# Patient Record
Sex: FEMALE | Race: WHITE | NOT HISPANIC OR LATINO | Employment: FULL TIME | URBAN - METROPOLITAN AREA
[De-identification: names, ages, dates, MRNs, and addresses within clinical notes are randomized per-mention and may not be internally consistent; named-entity substitution may affect disease eponyms.]

---

## 2017-02-15 ENCOUNTER — ALLSCRIPTS OFFICE VISIT (OUTPATIENT)
Dept: OTHER | Facility: OTHER | Age: 22
End: 2017-02-15

## 2017-03-22 ENCOUNTER — GENERIC CONVERSION - ENCOUNTER (OUTPATIENT)
Dept: OTHER | Facility: OTHER | Age: 22
End: 2017-03-22

## 2017-03-22 ENCOUNTER — ALLSCRIPTS OFFICE VISIT (OUTPATIENT)
Dept: OTHER | Facility: OTHER | Age: 22
End: 2017-03-22

## 2017-03-27 ENCOUNTER — GENERIC CONVERSION - ENCOUNTER (OUTPATIENT)
Dept: OTHER | Facility: OTHER | Age: 22
End: 2017-03-27

## 2017-04-05 ENCOUNTER — ALLSCRIPTS OFFICE VISIT (OUTPATIENT)
Dept: PSYCHOLOGY | Facility: CLINIC | Age: 22
End: 2017-04-05
Payer: COMMERCIAL

## 2017-04-05 PROCEDURE — H0035 MH PARTIAL HOSP TX UNDER 24H: HCPCS | Performed by: PSYCHIATRY & NEUROLOGY

## 2017-04-05 PROCEDURE — 90791 PSYCH DIAGNOSTIC EVALUATION: CPT | Performed by: PSYCHIATRY & NEUROLOGY

## 2017-04-06 ENCOUNTER — APPOINTMENT (OUTPATIENT)
Dept: PSYCHOLOGY | Facility: CLINIC | Age: 22
End: 2017-04-06
Payer: COMMERCIAL

## 2017-04-06 ENCOUNTER — GENERIC CONVERSION - ENCOUNTER (OUTPATIENT)
Dept: OTHER | Facility: OTHER | Age: 22
End: 2017-04-06

## 2017-04-06 PROCEDURE — H0035 MH PARTIAL HOSP TX UNDER 24H: HCPCS | Performed by: PSYCHIATRY & NEUROLOGY

## 2017-04-07 ENCOUNTER — GENERIC CONVERSION - ENCOUNTER (OUTPATIENT)
Dept: OTHER | Facility: OTHER | Age: 22
End: 2017-04-07

## 2017-04-10 ENCOUNTER — GENERIC CONVERSION - ENCOUNTER (OUTPATIENT)
Dept: OTHER | Facility: OTHER | Age: 22
End: 2017-04-10

## 2017-04-10 ENCOUNTER — APPOINTMENT (OUTPATIENT)
Dept: PSYCHOLOGY | Facility: CLINIC | Age: 22
End: 2017-04-10
Payer: COMMERCIAL

## 2017-04-10 PROCEDURE — H0035 MH PARTIAL HOSP TX UNDER 24H: HCPCS | Performed by: PSYCHIATRY & NEUROLOGY

## 2017-04-11 ENCOUNTER — APPOINTMENT (OUTPATIENT)
Dept: PSYCHOLOGY | Facility: CLINIC | Age: 22
End: 2017-04-11
Payer: COMMERCIAL

## 2017-04-11 ENCOUNTER — GENERIC CONVERSION - ENCOUNTER (OUTPATIENT)
Dept: OTHER | Facility: OTHER | Age: 22
End: 2017-04-11

## 2017-04-11 PROCEDURE — H0035 MH PARTIAL HOSP TX UNDER 24H: HCPCS | Performed by: PSYCHIATRY & NEUROLOGY

## 2017-04-12 ENCOUNTER — GENERIC CONVERSION - ENCOUNTER (OUTPATIENT)
Dept: OTHER | Facility: OTHER | Age: 22
End: 2017-04-12

## 2017-04-12 ENCOUNTER — ALLSCRIPTS OFFICE VISIT (OUTPATIENT)
Dept: OTHER | Facility: OTHER | Age: 22
End: 2017-04-12

## 2017-04-13 ENCOUNTER — APPOINTMENT (OUTPATIENT)
Dept: PSYCHOLOGY | Facility: CLINIC | Age: 22
End: 2017-04-13
Payer: COMMERCIAL

## 2017-04-13 ENCOUNTER — GENERIC CONVERSION - ENCOUNTER (OUTPATIENT)
Dept: OTHER | Facility: OTHER | Age: 22
End: 2017-04-13

## 2017-04-13 PROCEDURE — H0035 MH PARTIAL HOSP TX UNDER 24H: HCPCS | Performed by: PSYCHIATRY & NEUROLOGY

## 2017-04-14 ENCOUNTER — GENERIC CONVERSION - ENCOUNTER (OUTPATIENT)
Dept: OTHER | Facility: OTHER | Age: 22
End: 2017-04-14

## 2017-04-17 ENCOUNTER — GENERIC CONVERSION - ENCOUNTER (OUTPATIENT)
Dept: OTHER | Facility: OTHER | Age: 22
End: 2017-04-17

## 2017-04-18 ENCOUNTER — APPOINTMENT (OUTPATIENT)
Dept: PSYCHOLOGY | Facility: CLINIC | Age: 22
End: 2017-04-18
Payer: COMMERCIAL

## 2017-04-18 ENCOUNTER — GENERIC CONVERSION - ENCOUNTER (OUTPATIENT)
Dept: OTHER | Facility: OTHER | Age: 22
End: 2017-04-18

## 2017-04-18 PROCEDURE — H0035 MH PARTIAL HOSP TX UNDER 24H: HCPCS | Performed by: PSYCHIATRY & NEUROLOGY

## 2017-04-19 ENCOUNTER — GENERIC CONVERSION - ENCOUNTER (OUTPATIENT)
Dept: OTHER | Facility: OTHER | Age: 22
End: 2017-04-19

## 2017-04-20 ENCOUNTER — GENERIC CONVERSION - ENCOUNTER (OUTPATIENT)
Dept: OTHER | Facility: OTHER | Age: 22
End: 2017-04-20

## 2017-04-20 ENCOUNTER — APPOINTMENT (OUTPATIENT)
Dept: PSYCHOLOGY | Facility: CLINIC | Age: 22
End: 2017-04-20
Payer: COMMERCIAL

## 2017-04-20 PROCEDURE — H0035 MH PARTIAL HOSP TX UNDER 24H: HCPCS | Performed by: PSYCHIATRY & NEUROLOGY

## 2017-05-10 ENCOUNTER — ALLSCRIPTS OFFICE VISIT (OUTPATIENT)
Dept: OTHER | Facility: OTHER | Age: 22
End: 2017-05-10

## 2017-05-12 ENCOUNTER — GENERIC CONVERSION - ENCOUNTER (OUTPATIENT)
Dept: OTHER | Facility: OTHER | Age: 22
End: 2017-05-12

## 2017-06-07 ENCOUNTER — ALLSCRIPTS OFFICE VISIT (OUTPATIENT)
Dept: OTHER | Facility: OTHER | Age: 22
End: 2017-06-07

## 2017-06-10 ENCOUNTER — HOSPITAL ENCOUNTER (EMERGENCY)
Facility: HOSPITAL | Age: 22
Discharge: HOME | End: 2017-06-10
Attending: EMERGENCY MEDICINE | Admitting: EMERGENCY MEDICINE
Payer: COMMERCIAL

## 2017-06-10 VITALS
HEART RATE: 64 BPM | TEMPERATURE: 97.9 F | OXYGEN SATURATION: 100 % | WEIGHT: 164 LBS | HEIGHT: 65 IN | BODY MASS INDEX: 27.32 KG/M2 | DIASTOLIC BLOOD PRESSURE: 77 MMHG | RESPIRATION RATE: 16 BRPM | SYSTOLIC BLOOD PRESSURE: 129 MMHG

## 2017-06-10 DIAGNOSIS — R42 DIZZINESS: Primary | ICD-10-CM

## 2017-06-10 LAB
ATRIAL RATE: 63 BPM
P AXIS: 60 DEGREES
PR INTERVAL: 118 MS
QRS AXIS: 82 DEGREES
QRSD INTERVAL: 96 MS
QT INTERVAL: 400 MS
QTC INTERVAL: 409 MS
T WAVE AXIS: 50 DEGREES
VENTRICULAR RATE: 63 BPM

## 2017-06-10 PROCEDURE — 93005 ELECTROCARDIOGRAM TRACING: CPT

## 2017-06-10 PROCEDURE — 99284 EMERGENCY DEPT VISIT MOD MDM: CPT

## 2017-06-10 RX ORDER — VILAZODONE HYDROCHLORIDE 10 MG/1
10 TABLET ORAL
COMMUNITY
End: 2018-02-28

## 2017-06-10 RX ORDER — GABAPENTIN 100 MG/1
200 CAPSULE ORAL 3 TIMES DAILY
COMMUNITY
End: 2018-04-20

## 2017-06-10 RX ORDER — MELOXICAM 15 MG/1
15 TABLET ORAL DAILY
COMMUNITY

## 2017-06-10 RX ORDER — SPIRONOLACTONE 50 MG/1
50 TABLET, FILM COATED ORAL 2 TIMES DAILY
COMMUNITY

## 2017-06-10 RX ORDER — CLONAZEPAM 1 MG/1
1.5 TABLET ORAL
COMMUNITY
End: 2018-02-13 | Stop reason: SDUPTHER

## 2017-06-16 ENCOUNTER — ALLSCRIPTS OFFICE VISIT (OUTPATIENT)
Dept: OTHER | Facility: OTHER | Age: 22
End: 2017-06-16

## 2017-06-20 ENCOUNTER — ALLSCRIPTS OFFICE VISIT (OUTPATIENT)
Dept: OTHER | Facility: OTHER | Age: 22
End: 2017-06-20

## 2017-07-07 ENCOUNTER — GENERIC CONVERSION - ENCOUNTER (OUTPATIENT)
Dept: OTHER | Facility: OTHER | Age: 22
End: 2017-07-07

## 2017-07-11 ENCOUNTER — ALLSCRIPTS OFFICE VISIT (OUTPATIENT)
Dept: OTHER | Facility: OTHER | Age: 22
End: 2017-07-11

## 2017-07-14 ENCOUNTER — ALLSCRIPTS OFFICE VISIT (OUTPATIENT)
Dept: OTHER | Facility: OTHER | Age: 22
End: 2017-07-14

## 2017-07-28 ENCOUNTER — GENERIC CONVERSION - ENCOUNTER (OUTPATIENT)
Dept: OTHER | Facility: OTHER | Age: 22
End: 2017-07-28

## 2017-08-04 ENCOUNTER — GENERIC CONVERSION - ENCOUNTER (OUTPATIENT)
Dept: OTHER | Facility: OTHER | Age: 22
End: 2017-08-04

## 2017-08-08 ENCOUNTER — ALLSCRIPTS OFFICE VISIT (OUTPATIENT)
Dept: OTHER | Facility: OTHER | Age: 22
End: 2017-08-08

## 2017-08-31 ENCOUNTER — ALLSCRIPTS OFFICE VISIT (OUTPATIENT)
Dept: OTHER | Facility: OTHER | Age: 22
End: 2017-08-31

## 2017-09-01 ENCOUNTER — ALLSCRIPTS OFFICE VISIT (OUTPATIENT)
Dept: OTHER | Facility: OTHER | Age: 22
End: 2017-09-01

## 2017-10-04 ENCOUNTER — ALLSCRIPTS OFFICE VISIT (OUTPATIENT)
Dept: OTHER | Facility: OTHER | Age: 22
End: 2017-10-04

## 2017-10-11 ENCOUNTER — ALLSCRIPTS OFFICE VISIT (OUTPATIENT)
Dept: OTHER | Facility: OTHER | Age: 22
End: 2017-10-11

## 2017-10-12 ENCOUNTER — GENERIC CONVERSION - ENCOUNTER (OUTPATIENT)
Dept: BEHAVIORAL HEALTH UNIT | Facility: HOSPITAL | Age: 22
End: 2017-10-12

## 2017-10-19 ENCOUNTER — GENERIC CONVERSION - ENCOUNTER (OUTPATIENT)
Dept: OTHER | Facility: OTHER | Age: 22
End: 2017-10-19

## 2017-10-26 ENCOUNTER — GENERIC CONVERSION - ENCOUNTER (OUTPATIENT)
Dept: OTHER | Facility: OTHER | Age: 22
End: 2017-10-26

## 2017-10-26 ENCOUNTER — ALLSCRIPTS OFFICE VISIT (OUTPATIENT)
Dept: OTHER | Facility: OTHER | Age: 22
End: 2017-10-26

## 2017-11-29 ENCOUNTER — ALLSCRIPTS OFFICE VISIT (OUTPATIENT)
Dept: OTHER | Facility: OTHER | Age: 22
End: 2017-11-29

## 2017-12-13 ENCOUNTER — ALLSCRIPTS OFFICE VISIT (OUTPATIENT)
Dept: OTHER | Facility: OTHER | Age: 22
End: 2017-12-13

## 2017-12-19 ENCOUNTER — ALLSCRIPTS OFFICE VISIT (OUTPATIENT)
Dept: OTHER | Facility: OTHER | Age: 22
End: 2017-12-19

## 2017-12-27 ENCOUNTER — ALLSCRIPTS OFFICE VISIT (OUTPATIENT)
Dept: OTHER | Facility: OTHER | Age: 22
End: 2017-12-27

## 2018-01-09 NOTE — PSYCH
Messages    Raul Ly is under my professional care  She was seen in my office on 4/12/2017    She is not able to return to work until further notice  Patient is currently receiving daily treatment for a medical condition that prohibits her from working at this time  Please excuse her from working until she is medically cleared to return to work  Thanks for your understanding  BERE Llamas  Signatures   Electronically signed by :  BERE Llamas ; Apr 12 2017  1:37PM EST                       (Author)
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
## 2018-01-09 NOTE — MISCELLANEOUS
Message  Provider reviewed [employer] testing results with patient  Discussed that patient may have more side effects on Wellbutrin due to genotype, patient was already interested in switching medication due to concerns about side effect profile  Discussed tapering Wellbutrin SR to 100 mg bid for 1 week, then 100 mg daily for 1 week, then discontinuing medication  Will start Viibryd 10 mg daily once patient has tapered to Wellbutrin  mg daily  Will f/u at next scheduled visit on 4/12/17  Patient still interested in [address], awaiting to hear about openings for the program  Patient requests the [employer] testing results be e-mailed to her, gives consent for electronic transmission of results      -Will start Viibryd 10 mg daily for depressive symptoms  1        1 Amended By: Cuca Randall; Mar 28 2017 2:07 PM EST    Plan  Severe recurrent major depression    · Start: Viibryd 10 MG Oral Tablet; TAKE 1 TABLET Daily1    · Changed: From  BuPROPion HCl ER (SR) 150 MG Oral Tablet Extended Release 12  Hour TAKE 1 TABLET DAILY FOR 1 WEEK, THEN TAKE 1 TABLET TWICE DAILY To  BuPROPion HCl ER (SR) 100 MG Oral Tablet Extended Release 12 Hour Take 1 tablet  twice daily for 1 week, then 1 tablet daily for 1 week, then discontinue     1 Amended By: Cuca Randall; Mar 28 2017 2:08 PM EST    Signatures   Electronically signed by :  BERE Alcala ; Mar 28 2017  2:08PM EST                       (Author)

## 2018-01-10 ENCOUNTER — GENERIC CONVERSION - ENCOUNTER (OUTPATIENT)
Dept: OTHER | Facility: OTHER | Age: 23
End: 2018-01-10
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
## 2018-01-10 NOTE — PSYCH
Psych Med Mgmt    Appearance: was calm and cooperative  Observed mood: depressed, irritable and anxious  Observed mood: affect appropriate  Speech: a normal rate  Thought processes: normal thought processes  Hallucinations: no hallucinations present  Thought Content: no delusions  Abnormal Thoughts: The patient has no suicidal thoughts  Orientation: The patient is oriented to person, place and time, oriented to person, oriented to place and oriented to time  Recent and Remote Memory: short term memory intact and long term memory intact  Insight: Limited insight  Judgment: Concentration decreased Her judgment was limited  Muscle Strength And Tone  Muscle strength and tone were normal  Normal gait and station  Language: no difficulty naming common objects, no difficulty repeating a phrase and no difficulty writing a sentence  Fund of knowledge: Patient displays  Average  The patient is experiencing no localized pain  On a scale of 0 - 10 the pain severity is a 0  Treatment Recommendations: I met with Vickie Chapmanes by myself  Her mother had called a few days ago requesting for Vickie Huang to be seen that she was more depressed and not getting out of her room  When I saw Vickie Chapmanes today she stated she has not done well since she stopped her medications  Last time she had stated she thought her medications were giving her a lot of side effects and they were not as effective and she wanted to try without it  Since then she has noticed that she is shay , that she is often angry and irritable and at the same very depressed and isolating herself    She has not been in contact with any friends not returning their messages (however someone texted her while she was in my office and she responded, when I brought that to her attention she said Oh, this is the first time I've answered back)  As we reviewed her medications that she had tried in the past and the symptoms she has in the present, we discussed the need for an antidepressant that could help her at night with her sleep, since insomnia has been an issue for her for so many years  In her family she has several members that suffer from bipolar disorder and while her symptoms to not raise to the level of bipolar illness she is experiencing significant labile mood and irritability that we did talk about the mood stabilizer such as Lamictal    We discussed benefits risks and side effects and she had agreed  After she left the pharmacy called me stating that it affected the levels of both Lamictal and her birth control , so we put it on hold for now  As far as her depression and anxiety we did discuss Luvox, after hearing benefits and risks she was willing to try starting 50 mg with half a tablet with dinner and to increase to one tablet after a week  She denied suicidal thoughts or plans and no thoughts about hurting other people  She brought me a form for homebound school and hopes that that will happen for her soon  She has not been in school since the beginning of the year  Bucky Velez will call me with any problems, in the past she says she has called, and no one called her back, so I told her she could call the after hours service and asked that they give us a message to contact her the next day  We made an appointment for Friday, January 29 at 11:00  Vitals  Signs [Data Includes: Current Encounter]   Recorded: 61YQM7141 12:07PM   Height: 5 ft 4 5 in  2-20 Stature Percentile: 54 %  Weight: 196 lb   2-20 Weight Percentile: 97 %  BMI Calculated: 33 12  BMI Percentile: 97 %  BSA Calculated: 1 95    Assessment    1  Anxiety disorder (300 00) (F41 9)   2  Severe recurrent major depression (296 33) (F33 2)    Plan    1  FluvoxaMINE Maleate 50 MG Oral Tablet; Take 1/2 tablet for four days then one daily   in the evening    2  LamoTRIgine 25 MG Oral Tablet (LaMICtal);  Take 1/2 tablet for three days, then one   tablet for two weeks,   then two daily    Review of Systems    Constitutional: recent 10 lb weight gain  Cardiovascular: no complaints of slow or fast heart rate, no chest pain, no palpitations  Respiratory: no complaints of shortness of breath, no wheezing, no dyspnea on exertion  Gastrointestinal: no complaints of abdominal pain, no constipation, no nausea, no diarrhea, no vomiting  Genitourinary: no complaints of dysuria, no incontinence, no pelvic pain, no urinary frequency  Musculoskeletal: no complaints of arthralgia, no myalgias, no limb pain, no joint stiffness  Integumentary: Acne  Neurological: no complaints of headache, no confusion, no numbness, no dizziness  Active Problems    1  Anxiety disorder (300 00) (F41 9)   2  Persistent insomnia (307 42) (G47 00)   3  Severe recurrent major depression (296 33) (F33 2)    Past Medical History    1  History of asthma (V12 69) (Z87 09)    The active problems and past medical history were reviewed and updated today  Allergies    1  Penicillins    Current Meds    The medication list was reviewed and updated today  Family Psych History    1  Family history of Anxiety   2  Family history of Bipolar affective disorder   3  Family history of depression (V17 0) (Z81 8)    The family history was reviewed and updated today  Social History    · Never a smoker   · Parents are   The social history was reviewed and updated today  The social history was reviewed and is unchanged  Senior at [employer]  End of Encounter Meds    1  FluvoxaMINE Maleate 50 MG Oral Tablet; Take 1/2 tablet for four days then one daily in the   evening; Therapy: 07ARG9015 to (Evaluate:32Kif4285)  Requested for: 06CEF6878; Last   Rx:15Jan2016 Ordered    2  LamoTRIgine 25 MG Oral Tablet (LaMICtal); Take 1/2 tablet for three days, then one tablet   for two weeks,   then two daily;    Therapy: 62VXI0185 to (Evaluate:85Imi8957)  Requested for: 65XAJ3632; Last   Rx:15Jan2016 Ordered    Future Appointments    Date/Time Provider Specialty Site   01/27/2016 11:30 AM Delisa Stewart MD Psychiatry [facility]   01/29/2016 11:00 AM Delisa Stewart MD Psychiatry [place] [id number]     Signatures   Electronically signed by : Kuldeep Gates MD; Angel 15 2016  2:03PM EST                       (Author)

## 2018-01-10 NOTE — MISCELLANEOUS
Message  Provider spoke with patient on phone  Patient feels that Tyron Titus has been helping with her mood symptoms, has concerns about weight gain on medication  Patient continues to report having disrupted sleep at night, has been taking the Klonopin as prescribed  Patient cancelled appointment with provider on 5/15, advised to re-schedule appointment to further discuss medication changes  Will provide refill of Klonopin medication  -PDMP system checked   1        1 Amended By: Diane Flanagan; May 12 2017 5:10 PM EST    Plan  Anxiety disorder, Persistent insomnia    · Renew: ClonazePAM 1 MG Oral Tablet (KlonoPIN); Take 1/2 TABLET IN THE MORNING  AND 1 5 TABLET AT NIGHT AS NEEDED FOR ANXIETY    Signatures   Electronically signed by :  BERE Go ; May 12 2017  5:11PM EST                       (Author)

## 2018-01-10 NOTE — PSYCH
Progress Note  Psychotherapy Provided [facility]: Individual Psychotherapy 50 minutes provided today  Goals addressed in session:   Addressed goal 3 of initial plan    D: Met with Key individually  ROS; feels 'OK despite lots of stuff going on'  Session focused primarily on specific triggers affecting Key's view of herself emotionally and physically  This has been influenced by historic trauma, current and historic family dynamics and financial obstacles  Alexi North identified feelings of pride for specific risk taking skills regarding relationships, employment and boundaries  Acknowledged fleeting SI without a plan  A: Alexi North presented with appropriate mood and affect for topics of discussion today  Key's body image, self esteem deeply impact her negative thoughts of self  She does demonstrate progress in that she is taking risk to help challenge these thoughts  P: Continue weekly individual therapy for further discussion regarding taking risks to challenge anxiety, internalization and self esteem  P:       Pain Scale and Suicide Risk [facility]: Current Pain Assessment: no pain   Current suicide risk is low   Behavioral Health Treatment Plan Trupti Berrios: Diagnosis and Treatment Plan explained to patient, patient relates understanding diagnosis and is agreeable to Treatment Plan  Results/Data  PHQ-9 Adult Depression Screening 20Jun2017 02:04PM [employer]     Test Name Result Flag Reference   PHQ-9 Adult Depression Score 19     Over the last two weeks, how often have you been bothered by any of the following problems?   Little interest or pleasure in doing things: Several days - 1  Feeling down, depressed, or hopeless: Nearly every day - 3  Trouble falling or staying asleep, or sleeping too much: More than half the days - 2  Feeling tired or having little energy: More than half the days - 2  Poor appetite or over eating: Nearly every day - 3  Feeling bad about yourself - or that you are a failure or have let yourself or your family down: Nearly every day - 3  Trouble concentrating on things, such as reading the newspaper or watching television: Nearly every day - 3  Moving or speaking so slowly that other people could have noticed  Or the opposite -  being so fidgety or restless that you have been moving around a lot more than usual: Not at all - 0  Thoughts that you would be better off dead, or of hurting yourself in some way: More than half the days - 2   PHQ-9 Adult Depression Screening Positive     PHQ-9 Difficulty Level Very difficult     PHQ-9 Severity      Moderately Severe Depression     GAD7 - Generalized Anxiety Disorder 20Jun2017 01:56PM Cletis Art     Test Name Result Flag Reference   GAD7 - Anxiety Severity Level Severe Anxiety     GAD7 - Difficulty Level Very difficult     How difficult have those problems made it for you to do your work, take care of things at home, or get along with other people? GAD7 - Score 15     Over the last two weeks, how often have you been bothered by the following problems? Feeling nervous, anxious, or on edge Over half the days - 2  Not being able to stop or control worrying Nearly every day - 3  Worrying too much about different things Nearly every day - 3  Trouble relaxing Nearly every day - 3  Being so restless that it's hard to sit still Several days - 1  Becoming easily annoyed or irritable Several days - 1  Feeling afraid as if something awful might happen Over half the days - 2       Assessment    1  Anxiety disorder (300 00) (F41 9)   2   Severe recurrent major depression (296 33) (F33 2)    Signatures   Electronically signed by : Kari Jasso LCSW; Jun 20 2017  5:25PM EST                       (Author)

## 2018-01-10 NOTE — PSYCH
Treatment Plan Tracking    #1 Treatment Plan not completed within required time limits due to: Client cancelled/ no-showed scheduled appointment            Signatures   Electronically signed by : Amada Yepez LCSW; May 30 2017  9:09AM EST                       (Author)

## 2018-01-10 NOTE — PSYCH
Date of Initial Treatment Plan: 6/16/17  Date of Current Treatment Plan: 6/16/17  Treatment Plan 1  Strengths/Personal Resources for Self Care: Giving, good worker, mature, goal orientated  Diagnosis:   Axis I: Anxiety disorder; Mood Disorser; MDD recurrent; severe     Area of Needs: Work stress, historic trauma, toxic environment, medical issues, anxiety, depression  Long Term Goals:   I am satisfied with my level of independence   Target Date: 10/05/17      My anxiety and depression have decreased   Target Date: 10/05/17      I have addressed my historic and present family dynamics   Target Date: 10/05/17    Short Term Objectives:   Goal 1:   1  Family dynamics  2  Financial obligations  3  Alternate job interviews    Target Date: 10/05/17      Goal 2:   1  Emotional boundaries  2  Take time for myself  3  Driving  Target Date: 10/05/17      Goal 3:   1  I have moved out of my home  2  Historic trauma  Target Date: 10/05/17      GOAL 1: Modality: Individual 4 x per month Target Date: 10/05/17       The person(s) responsible for carrying out the plan is Christa Love  GOAL 2: Modality: Individual 4 x per month Target Date: 10/05/17       The person(s) responsible for carrying out the plan is Christa Love  GOAL 3: Modality: Individual 4 x per month Target Date: 10/05/17         The person(s) responsible for carrying out the plan is Christa Love  The first scheduled review date is 10/05/17  The expected length of service is 120 Days  Level of functioning at initial assessment: 70  The highest level of functioning in the past year was Unknown  The current level of functioning is 70               CLIENT COMMENTS / Please share your thoughts, feelings, need and/or experiences regarding your treatment plan: _____________________________________________________________________________________________________________________________________________________________________________________________________________________________________________________________________________________________________________________ Date/Time: ______________     Patient Signature: _________________________________ Date/Time: ______________       Electronically signed by : Harrison Durán, MOLINAW; Jun 16 2017 12:55PM EST                       (Author)

## 2018-01-11 NOTE — PSYCH
Psych Med Mgmt    Appearance: was calm and cooperative  Observed mood: depressed, irritable and anxious  Observed mood: affect appropriate  Speech: a normal rate  Thought processes: normal thought processes  Hallucinations: no hallucinations present  Thought Content: no delusions  Abnormal Thoughts: The patient has no suicidal thoughts  Orientation: The patient is oriented to person, place and time, oriented to person, oriented to place and oriented to time  Recent and Remote Memory: short term memory intact and long term memory intact  Judgment: Concentration varies   Muscle Strength And Tone  Muscle strength and tone were normal  Normal gait and station  Language: no difficulty naming common objects, no difficulty repeating a phrase and no difficulty writing a sentence  Fund of knowledge: Patient displays  At grade level  The patient is experiencing no localized pain  On a scale of 0 - 10 the pain severity is a 0  Treatment Recommendations: Raisa Romero was seen by herself  She stated she's not sure if medications are helpful or not  She still finds that she is very angry, shay and is just not happy  She also has a lot of anxiety but Klonopin seems to be helping with that and is also helping at night with her sleep  Because we're not sure if the medications are helping her or not, I discussed with her she could take Neurontin 100 mg up to 3 times per day, and the dose of Wellbutrin  mg in the morning is not therapeutic enough that she could take it twice a day  I did discuss with Key the medication lithium  I discussed its benefits and risks and asked her to discuss it with her mother, and to let me know if that would be something that they would consider  It could help with her labile mood and her irritability as well as her chronic depression and suicidal thoughts  I did tell them we had to get regular blood work and the therapeutic window of lithium    She did say she would discuss it with her mother and will let me know, for now we will continue with her present medications the same  She denied any suicidal thoughts or plans, she is just frustrated that she is not getting better  Assessment    1  Anxiety disorder (300 00) (F41 9)   2  Mood disorder (296 90) (F39)   3  Persistent insomnia (307 42) (G47 00)   4  Severe recurrent major depression (296 33) (F33 2)    Plan    1  Gabapentin 100 MG Oral Capsule; take one capsule THREE per day    2  ClonazePAM 1 MG Oral Tablet (KlonoPIN); Take 1/2  TABLET IN THE MORNING   AND ONE TABLET AT NIGHT IF NEEDED FOR ANXIETY    3  BuPROPion HCl ER (SR) 100 MG Oral Tablet Extended Release 12 Hour   (Wellbutrin SR); TAKE 1 TABLET TWICE PER DAY    Review of Systems    Constitutional: No fever, no chills, feels well, no tiredness, no recent weight gain or loss  Cardiovascular: no complaints of slow or fast heart rate, no chest pain, no palpitations  Respiratory: no complaints of shortness of breath, no wheezing, no dyspnea on exertion  Gastrointestinal: no complaints of abdominal pain, no constipation, no nausea, no diarrhea, no vomiting  Genitourinary: no complaints of dysuria, no incontinence, no pelvic pain, no urinary frequency  Musculoskeletal: no complaints of arthralgia, no myalgias, no limb pain, no joint stiffness  Integumentary: no complaints of skin rash, no itching, no dry skin  Neurological: no complaints of headache, no confusion, no numbness, no dizziness  Active Problems    1  Anxiety disorder (300 00) (F41 9)   2  Mood disorder (296 90) (F39)   3  Persistent insomnia (307 42) (G47 00)   4  Severe recurrent major depression (296 33) (F33 2)    Past Medical History    1  History of asthma (V12 69) (Z87 09)    The active problems and past medical history were reviewed and updated today  Allergies    1  Penicillins    Current Meds   1   BuPROPion HCl ER (SR) 100 MG Oral Tablet Extended Release 12 Hour; TAKE 1   TABLET DAILY; Therapy: 64IQH1631 to (Brennan Garzon)  Requested for: 79NBQ9142; Last   Rx:29Jan2016 Ordered   2  ClonazePAM 1 MG Oral Tablet; Take 1/2 to one tablet at bedtime if needed for anxiety; Therapy: 89ORQ9760 to (Evaluate:16Apr2016); Last Rx:17Acf2835 Ordered   3  Gabapentin 100 MG Oral Capsule; take one capsule twice per day; Therapy: 51ZYN7449 to (Evaluate:16Apr2016)  Requested for: 45GGV2988; Last   Rx:25Xil8529 Ordered    The medication list was reviewed and updated today  Family Psych History    1  Family history of Anxiety   2  Family history of Bipolar affective disorder   3  Family history of depression (V17 0) (Z81 8)    The family history was reviewed and updated today  Social History    · Never a smoker   · Parents are   The social history was reviewed and updated today  The social history was reviewed and is unchanged  She is a senior  End of Encounter Meds    1  Gabapentin 100 MG Oral Capsule; take one capsule THREE per day; Therapy: 02EJQ5277 to (Carito Dent)  Requested for: 98GVW1726; Last   Rx:08Mar2016 Ordered    2  ClonazePAM 1 MG Oral Tablet (KlonoPIN); Take 1/2  TABLET IN THE MORNING AND ONE   TABLET AT NIGHT IF NEEDED FOR ANXIETY; Therapy: 50TVF7616 to (Evaluate:14Krn8063); Last Rx:08Mar2016 Ordered    3  BuPROPion HCl ER (SR) 100 MG Oral Tablet Extended Release 12 Hour (Wellbutrin   SR); TAKE 1 TABLET TWICE PER DAY;    Therapy: 44XHM5089 to (Carito Dent)  Requested for: 00HOH0910; Last   Rx:08Mar2016 Ordered    Future Appointments    Date/Time Provider Specialty Site   03/29/2016 05:00 PM Patricia Green MD Psychiatry [facility]   04/01/2016 02:45 PM [facility]     Signatures   Electronically signed by : Lizzie Flores MD; Mar 19 2016 10:19PM EST                       (Author)
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
## 2018-01-11 NOTE — PSYCH
Treatment Plan Tracking    #1 Treatment Plan not completed within required time limits due to: Other: There was no time during the initial assessment to complete the treatment plan             Signatures   Electronically signed by : ANUJ Flannery; Apr 1 2016  4:11PM EST                       (Author)

## 2018-01-11 NOTE — MISCELLANEOUS
Message  I spoke with pt 's mother, Bishop العلي, and reviewed potential for decreased effectiveness of birth control when taking lamotrigine at the same time  Jojo Wilfredo verbalized understanding and wanted to followed med regimine per last office visit  Instructed to call with problems/concerns and verbalized understanding of same  Active Problems    1  Anxiety disorder (300 00) (F41 9)   2  Persistent insomnia (307 42) (G47 00)   3  Severe recurrent major depression (296 33) (F33 2)    Current Meds   1  FluvoxaMINE Maleate 50 MG Oral Tablet; Take 1/2 tablet for four days then one daily in   the evening; Therapy: 92YFD9132 to (Evaluate:91Dkr7756)  Requested for: 94MFK0147; Last   Rx:15Jan2016 Ordered   2  LamoTRIgine 25 MG Oral Tablet; Take 1/2 tablet for three days, then one tablet for two   weeks,   then two daily; Therapy: 83BCU0306 to (Evaluate:51Uge0544)  Requested for: 84BMU7568; Last   Rx:15Jan2016 Ordered    Allergies    1   Penicillins    Signatures   Electronically signed by : Tiffani Flowers RN; Jan 19 2016  4:14PM EST                       (Author)
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
## 2018-01-11 NOTE — PSYCH
Progress Note  Psychotherapy Provided [facility]: Individual Psychotherapy 50 minutes provided today  Goals addressed in session:   Addressed goals 1-3 of initial plan    D: Met with Key individually  ROS; ' I'm stressed ' Session focused upon specific circumstances regarding younger brother, relationship with boyfriend and mother  Pros and cons of applying to [employer] rather than [employer] as they have dorms to experience total independence  Money an issue but emotional stability may weight over future loans  Brief fleeting SI symptoms without a plan  A: Joe Siddiqui presented with flattened affect, congruent mood  She remains in a highly stressful situation regarding family and boyfriend's family  Living situation either of these is overwhelming and borderline toxic at times  P: Continue individual therapy  Continue process of prioritizing Key's needs so she can move forward in her independence  Pain Scale and Suicide Risk [facility]: Current Pain Assessment: no pain, moderate to severe   On a scale of 0 to 10, the patient rates current pain at 5   Current suicide risk is low   Behavioral Health Treatment Plan [facility]: Diagnosis and Treatment Plan explained to patient, patient relates understanding diagnosis and is agreeable to Treatment Plan  Assessment    1  Severe recurrent major depression (296 33) (F33 2)   2  Mood disorder (296 90) (F39)   3   Anxiety disorder (300 00) (F41 9)    Signatures   Electronically signed by : Andrew De León LCSW; Oct 26 2017 12:06PM EST                       (Author)
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
## 2018-01-13 VITALS — WEIGHT: 164.8 LBS
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
## 2018-01-13 NOTE — PSYCH
Assessment    1  Severe recurrent major depression (296 33) (F33 2)   2  Anxiety disorder (300 00) (F41 9)    [facility] Treatment Plan    [facility] Treatment Plan   AREAS OF NEEDS: Depression and anxiety as manifested by sleep disturbances, daily suicidal thoughts for several weeks without a plan or intent, family stressors, limited social supports and no friends, financial issues, stressful home environment (lots of family members in small space), has a new part-time job but feels anxious about driving to/from work due to not having much experience driving  Date Initiated: 4/5/17     LONG TERM GOAL: 1 0 I will identify two signs that my mood is more stable and I am more productive in my daily life  Date Initiated: 4/5/17   Target Date: 5/3/17      Date Initiated: 4/5/17   Revision Date: 4/14/17   1 1 I will attend [facility] Program every day, Monday through Friday, regularly  Target Date: 4/14/17    Date Initiated: 4/5/17   Revision Date: 4/14/17   1 2 I will state three cognitive and behavioral strategies I can use to practice following through with my designated goals and responsibilities  Target Date: 4/14/17    Date Initiated: 4/5/17   Revision Date: 4/14/17   1 3 I will take my medication as ordered and bring any questions, or concerns I have to be addressed when, or if, they arise  Target Date: 4/14/17    Date Initiated: 4/5/17   Revision Date: 4/14/17   1 4 I will name three supports, aside from family members, who will help me and state how each one of my supports will contribute to my recovery  Target Date: 4/14/17           7 DAY REVISION: 1 1,1 2,1 3,1 4 Continue these goals as outlined but unmet  Date Initiated: 4/14/17    Target Date: 4/25/17                  PSYCHIATRY: Medication management and education    Continue medication management and education   Date Initiated: 4/5/2017   Revision Date: 4/14/17   The person(s) responsible for carrying out the plan is Herminia ALEGRE  MD Hayden; [address]: 1 1,1,2,1 3,1 4 This RN will provide daily wellness group five days weekly to educate Mary Wang on her diagnoses and medications used in treatment  1 1,1 2,1 3,1 4 This RN will continue to provide daily wellness group, Monday through Friday, to continue educating Key on her diagnoses and medications  Date Initiated: 4/5/17     Revision Date: 4/14/17     The person(s) responsible for carrying out the plan is Stuart Muñiz RN    PSYCHOLOGY: 1 1, 1 2, 1 4 Provide psychotherapy group 5 times per week to allow opportunity for patient to explore stressors and ways of coping  1 1, 1 2, 1 4 Continue to provide psychotherapy group each program day to encourage Mary Wang to further explore stressors and healthier ways of coping  Date Initiated: 4/5/2017   Revision Date: 4/14/217   The person(s) responsible for carrying out the plan is ELLIOT Jimenez  ALLIED THERAPY: 1 1,1 2 Engage Key in AT group 5 times daily to encourage development and use of wellness tools to decrease symptoms and promote recovery through meaningful activity  1 1,1 2,1 5 Continue to encourage Mary Wang to participate in AT group daily to practice wellness tools within program and transfer to home sharing successes and barriers through healthy task involvement  Date Initiated: 4/5/17   Revision Date: 4/14/17   The person(s) responsible for carrying out the plan is JASPREET Flanagan  CASE MANAGEMENT: 1 0 This CM will meet with Key three times weekly to assess treatment progress, D/C planning and assist with UR and building supports for recovery  2 0 This CM will continue to meet with Key three times weekly to assist with D/C planning, building supports, UR and reviewing progress in [facility]  Date Initiated: 4/5/17    The person(s) responsible for carrying out the plan is Stuart Muñiz RN  TREATMENT REVIEW/COMMENTS: Mary Wang did not complete her treatment goals  DISCHARGE CRITERIA: I will identify three signs of improvement and complete my relapse prevention plan  DISCHARGE PLAN: OP Providers are:   Dr Jackelyn Heard, OP psychiatrist - Follow up appointment has been made for 4/12/17 and 5/15/17 at 11:30AM, Dipak Coreas  OP therapist  As per Key's request, an appointment has been made with Ms Krysten North for initial assessment scheduled at [facility], [address] on 5/10/17 at 10:00AM    Estimated Length of Stay: 7-10 days   Strengths: A good worker, good working with children   Limitations: Not enough supports for recovery   Diagnosis and Treatment Plan explained to patient, patient relates understanding diagnosis and is agreeable to Treatment Plan  CLIENT COMMENTS / Please share your thoughts, feelings, need and/or experiences regarding your treatment plan: _____________________________________________________________________________________________________________________________________________________________________________________________________________________________________________________________________________________________________________________ Date/Time: ______________           Patient Signature: _________________________________ Date/Time: ______________            Signatures   Electronically signed by : JASPREET Keene; Apr 5 2017  8:40AM EST                       (Author)    Electronically signed by : JORGE A Ames; Apr 5 2017  9:14AM EST                       (Author)    Electronically signed by : BERE Mcginnis ; Apr 5 2017 10:29AM EST                       (Author)    Electronically signed by : Trang Titus RN; Apr 5 2017  2:51PM EST                       (Author)    Electronically signed by : JASPREET Keene; Apr 14 2017  8:14AM EST                       (Author)    Electronically signed by : Fabi Daniels DO;  Apr 14 2017  8:29AM EST                       (Author)    Electronically signed by : Lakeisha Lynch MSWLSW; Apr 14 2017 12:09PM EST                       (Author)    Electronically signed by : Adalberto Santoyo RN; Apr 19 2017 12:07PM EST                       (Author)    Electronically signed by : Adalberto Santoyo RN; Apr 21 2017 10:31AM EST                       (Author)
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
## 2018-01-14 VITALS
BODY MASS INDEX: 26.66 KG/M2 | HEIGHT: 65 IN | DIASTOLIC BLOOD PRESSURE: 74 MMHG | WEIGHT: 160 LBS | RESPIRATION RATE: 18 BRPM | SYSTOLIC BLOOD PRESSURE: 114 MMHG | HEART RATE: 84 BPM | TEMPERATURE: 98.2 F

## 2018-01-14 VITALS — WEIGHT: 163.5 LBS | BODY MASS INDEX: 27.63 KG/M2

## 2018-01-14 NOTE — DISCHARGE SUMMARY
Discharge Summary  [facility] Discharge Summary [facility]:   Admission Date: 4/5/17  Patient was referred by Dr Vesta Martin  Discharge Date: 4/20/17  This was a routine discharge  Diagnosis: Axis I: F33 2, F41 9  Treating Physician: Dr Ismael Mariscal MD    Treatment Complications: Matthew Almeida was absent five times while attending [facility]  Presenting Problem: Rayne Mayfield is a 25year old female referred by Dr Mignon Gupta, her psychiatrist, because she is very depressed  She feels depressed, anxious, has sleep disturbances , daily suicidal thoughts without a plan or intent for several weeks  Patient has family stressors, has limited social support and financial issues  She states there are a lot of people living in her house, and no one is responsible, and that is affecting her  She has no friends  She just started to work and this is helping her  Today she feels depressed, anxious, denies suicidal thoughts, plan or intent, denies any homicidal thoughts and denies any psychotic symptoms  Denies any manic episodes  Her PHQ-9 is 23  Course of treatment includes group counseling, pharmacotherapy, individual case management, allied therapy, psychoeducation and psychiatric evaluation  Treatment Progress: Matthew Almeida attended seven treatment days at [facility] and was absent for five treatment days  Morgankarson Almeida did not complete primary treatment goal, that was to focus on attending Program regularly  Matthew Almeida discussed, on admission, difficulty following through with regular attendance at school (often late, absent and did not finish high school but "I was promoted anyway") Pattern of missing appointments regularly including therapy, medical and calling out of work was a behavior that Matthew Almeida recognized as self-defeating, and that she repeated over and over  Although while at [facility] she continued this pattern finding reasons not to attend, work on treatment goals or participate actively in group   Matthew Almeida stated that she did not find the group format helpful as she preferred to meet one to one with a therapist and often requested "special time and sessions" to meet with CM to discuss an immediate concern  Gareth Harvey had discussed obtaining an outpatient therapist with Dr Bucky Aguila, and had previously declined this suggestion for follow up, but was able to verbalize while at [facility] that perhaps she did need the support of an individual outpatient therapist post [facility] D/C and appointment was set up for Gareth Harvey to see an outpatient therapist, Nain Monge at [address] [place] [address] per her request  Support group attendance was discussed but Gareth Harvey did not feel she wanted to attend at this time  Gareth Harvey left her part-time job while at [employer] stating that "it was too much to handle going to [facility] and going to work afterwards " Gareth Harvey complained of several side effects from new medication Viibryd she was started on by Dr Bucky Aguila such as feeling it kept her awake at night and exacerbated her insomnia that she has had since childhood  She saw Dr Bucky Aguila on 4/12/17 and spoke with him afterwards, via phone call, to address these issues  Gareth Harvey stated on discharge that she informed Dr Bucky Aguila that she wanted to give the Viibryd at least another week before going off this medication  Gareth Harvey denied SI and HI thoughts, ideas and plan on discharge  Gareth Harvey denied psychotic and manic symptoms  Aftercare recommendations include  New outpatient therapist was arranged for Gareth Harvey while she was at [facility], per her request  See Below  aGreth Harvey will follow up with her previous outpatient psychiatrist, Dr Darion Humphries  Both OP providers are at [address] [place] [address]  Discharge Medications include: See Below  Discharge [address]  Discharge Instructions [facility]:     Disposition: Home/Family  Address: [address] [place] [address] [place], [address]  Diagnosis: F33 2, F41 9     Allergies (Drug/Food): Penicillins  Activity: you may not return to work until OP psychiatrist will sign back to work, but you may drive  Diet: Regular  Follow-up appointments/Referrals: Outpatient therapist appointment has been made with Elizabeth Villegas on 5/10/17 at [address] psychiatrist appointment has been made with Dr Bib Mccauley on 5/15/17 at 11:30AM  Both outpatient providers are located at : [facility], [facility], [place], [address]   ICM/CTT: Information and handouts on depression support groups shared on discharge  [facility]: [place] [phone]  Intake/Referral/Evaluation (Non-Emergency) *NON INSURED FOR FUNDING: [facility]: [phone]  Crisis Intervention (Emergency) [place] Service: [facility]: [phone]  National Crisis Intervention Hotline: 9-544.855.9306  National Suicide Crisis Hotline: 5-712.799.5819  I, the undersigned, have received and understand the above instructions  Patient/Rep Signature: __________________________________ Date/Time: ______________         Physician Signature: ____________________________________ Date/Time: ______________              Signature: ________________________________ Date/Time: ______________          Current Meds   1  ClonazePAM 1 MG Oral Tablet; Take 1/2 TABLET IN THE MORNING AND 1 5 TABLET AT   NIGHT AS NEEDED FOR ANXIETY; Therapy: 06QQE6175 to (Evaluate:11Jun2017)  Requested for: 12Apr2017; Last   Rx:12Apr2017 Ordered   2  Gabapentin 100 MG Oral Capsule; TAKE 1 CAPSULE 3 times daily; Therapy: 56BIG3722 to (Evaluate:21May2017)  Requested for: 42MRK2077; Last   Rx:22Mar2017 Ordered   3  Meloxicam 15 MG Oral Tablet; Therapy: 06KIF2182 to Recorded   4  Necon 1/50 (28) 1-50 MG-MCG Oral Tablet; Therapy: (Recorded:86Mwx9395) to Recorded   5  Omeprazole 40 MG Oral Capsule Delayed Release; Therapy: 35FIW7826 to Recorded   6   Spironolactone 50 MG Oral Tablet; Take 1 tablet twice daily; Therapy: (Recorded:41Sfw0337) to Recorded   7  Viibryd 20 MG Oral Tablet; Take 1 tablet daily; Therapy: 66XFC8443 to (Evaluate:28Nmn3312)  Requested for: 12Apr2017; Last   Rx:78Ktp3420 Ordered    Allergies    1  Penicillins    Future Appointments    Date/Time Provider Specialty Site   04/21/2017 11:00 AM BERE Vasquez  Psychiatry [facility] HOSPITALIZATION   05/10/2017 10:00 AM Uday Mcclain LCSW Psychiatry [facility]   05/15/2017 11:30 AM BERE Venegas   Psychiatry [place] [address]     Signatures   Electronically signed by : Ke Harris RN; Apr 20 2017 12:08PM EST                       (Author)    Electronically signed by : Ke Harris RN; Apr 20 2017 12:08PM EST                       (Author)    Electronically signed by : Ke Harris RN; Apr 21 2017 10:06AM EST                       (Author)    Electronically signed by : BERE Terry ; Apr 21 2017 10:58AM EST                       (Author)
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
## 2018-01-14 NOTE — PSYCH
Message  Message Free Text Note Form: Pharmacy had requested Clonazepam, but according to records, PT should still have medications  OI      Active Problems    1  Anxiety disorder (300 00) (F41 9)   2  Mood disorder (296 90) (F39)   3  Persistent insomnia (307 42) (G47 00)   4  Problem with child being bullied (995 51) (T74 32XA)   5  Severe recurrent major depression (296 33) (F33 2)    Current Meds   1  BuPROPion HCl ER (SR) 100 MG Oral Tablet Extended Release 12 Hour (Wellbutrin   SR); TAKE 1 TABLET TWICE PER DAY; Therapy: 55QCJ3416 to (Ena William)  Requested for: 92Hns2698; Last   Rx:66Qgf7841 Ordered   2  ClonazePAM 1 MG Oral Tablet (KlonoPIN); Take 1/2  TABLET IN THE MORNING AND ONE   TABLET AT NIGHT IF NEEDED FOR ANXIETY; Therapy: 50OEC6863 to (Evaluate:29Nov2016); Last Rx:31Evq3058 Ordered   3  Gabapentin 100 MG Oral Capsule; TAKE ONE CAPSULE 3 TIMES A DAY; Therapy: 57KLC5097 to (Evaluate:29Nov2016)  Requested for: 64Iau3081; Last   Rx:90Sfa5168 Ordered    Allergies    1   Penicillins    Signatures   Electronically signed by : Stacy Jiménez MD; Oct 28 2016  6:48PM EST                       (Author)    Electronically signed by : Stacy Jiménez MD; Oct 28 2016  6:50PM EST                       (Author)
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
## 2018-01-16 NOTE — PSYCH
Psych Med Mgmt    Appearance: was calm and cooperative  Observed mood: depressed and anxious  Observed mood: affect appropriate  Speech: a normal rate  Thought processes: normal thought processes  Hallucinations: no hallucinations present  Thought Content: no delusions  Abnormal Thoughts: The patient has no suicidal thoughts  Orientation: The patient is oriented to person, place and time, oriented to person, oriented to place and oriented to time  Recent and Remote Memory: short term memory intact and long term memory intact  Insight: Limited insight  Judgment: Concentration fluctuates Her judgment was limited  Muscle Strength And Tone  Muscle strength and tone were normal    Language: no difficulty naming common objects, no difficulty repeating a phrase and no difficulty writing a sentence  Fund of knowledge: Patient displays  At grade level  The patient is experiencing no localized pain  On a scale of 0 - 10 the pain severity is a 0  Treatment Recommendations: I met with Anai Edwards by myself  Anai Edwards stated that on the Luvox she was not able to sleep and she thought it was getting her more agitated  She's not sure what the Lamictal is doing but she still has not been able to sleep now for 3 days now, and feels very agitated and irritable as well as anxious and depressed  Even though patient is irritable and angry but does not feel like her energy level has increased and feels very depressed and anxious  We talked about different families of anti-depressants and she has never been on Wellbutrin  We discussed benefits risks and if she notices worsening of for agitation or anxiety to stop the medicine and call me  She has a half-brother who has done well on Wellbutrin and she was willing to try it  Because she needs to be able to sleep and break the cycle of insomnia with discussed trying Ambien 5 mg to take one to 2 tablets at bedtime   I instructed patient to take her Ambien when she is in bed already and not to be walking around after she's taken her Ambien  She understood the risks and is willing to consider it  For now we will continue with the Lamictal 2 tablets in the morning along with Wellbutrin  mg in the morning  Alexandru Kailynarcenio will call me if there is any problems otherwise I will see her in 3-4 weeks  Assessment    1  Anxiety disorder (300 00) (F41 9)   2  Persistent insomnia (307 42) (G47 00)   3  Severe recurrent major depression (296 33) (F33 2)    Plan    1  FluvoxaMINE Maleate 50 MG Oral Tablet    2  Zolpidem Tartrate 5 MG Oral Tablet (Ambien); TAKE 1 TABLET AT  BEDTIME AS   NEEDED FOR INSOMNIA    3  BuPROPion HCl ER (SR) 100 MG Oral Tablet Extended Release 12 Hour   (Wellbutrin SR); TAKE 1 TABLET DAILY    Review of Systems    Constitutional: No fever, no chills, feels well, no tiredness, no recent weight gain or loss  Cardiovascular: no complaints of slow or fast heart rate, no chest pain, no palpitations  Respiratory: no complaints of shortness of breath, no wheezing, no dyspnea on exertion  Gastrointestinal: no complaints of abdominal pain, no constipation, no nausea, no diarrhea, no vomiting  Genitourinary: no complaints of dysuria, no incontinence, no pelvic pain, no urinary frequency  Musculoskeletal: no complaints of arthralgia, no myalgias, no limb pain, no joint stiffness  Integumentary: no complaints of skin rash, no itching, no dry skin  Neurological: no complaints of headache, no confusion, no numbness, no dizziness  Active Problems    1  Anxiety disorder (300 00) (F41 9)   2  Persistent insomnia (307 42) (G47 00)   3  Severe recurrent major depression (296 33) (F33 2)    Past Medical History    1  History of asthma (V12 69) (Z87 09)    The active problems and past medical history were reviewed and updated today  Allergies    1  Penicillins    Current Meds   1  FluvoxaMINE Maleate 50 MG Oral Tablet;  Take 1/2 tablet for four days then one daily in the   evening; Therapy: 87OGB6449 to (Evaluate:02Pzr1368)  Requested for: 36FVR1485; Last   Rx:15Jan2016 Ordered   2  LamoTRIgine 25 MG Oral Tablet; Take 1/2 tablet for three days, then one tablet for two   weeks,   then two daily; Therapy: 22EXK1055 to (Evaluate:42Xwd8779)  Requested for: 06GMH1754; Last   Rx:15Jan2016 Ordered    The medication list was reviewed and updated today  Family Psych History    1  Family history of Anxiety   2  Family history of Bipolar affective disorder   3  Family history of depression (V17 0) (Z81 8)    The family history was reviewed and updated today  Social History    · Never a smoker   · Parents are   The social history was reviewed and updated today  The social history was reviewed and is unchanged  She is a senior  Awaiting to start [employer]      End of Encounter Meds    1  Zolpidem Tartrate 5 MG Oral Tablet (Ambien); TAKE 1 TABLET AT  BEDTIME AS NEEDED   FOR INSOMNIA; Therapy: 13GOD6518 to (Evaluate:29Mar2016); Last Rx:29Jan2016 Ordered    2  BuPROPion HCl ER (SR) 100 MG Oral Tablet Extended Release 12 Hour (Wellbutrin   SR); TAKE 1 TABLET DAILY; Therapy: 38RIS3067 to ([facility])  Requested for: 86RRT9203; Last   Rx:29Jan2016 Ordered   3  LamoTRIgine 25 MG Oral Tablet (LaMICtal); Take 1/2 tablet for three days, then one tablet   for two weeks,   then two daily;    Therapy: 09QYE4418 to (Evaluate:14Feb2016)  Requested for: 71ZAV9638; Last   Rx:15Jan2016 Ordered    Future Appointments    Date/Time Provider Specialty Site   03/08/2016 09:30 AM Erica Barros MD Psychiatry [facility]     Signatures   Electronically signed by : Alona Cole MD; Feb 6 2016  9:54PM EST                       (Author)
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
## 2018-01-17 NOTE — MISCELLANEOUS
Message  Provider spoke with patient  Patient reports feeling that Kalani Stack has had positive effects on her mood but has reported disrupted sleep with restlessness at night, increased dizziness since starting the medication  Patient expresses concerns about gaining weight on medication  Discussed alternative options for medications including Fetzima or Pristiq, patient would like to give Viibryd another week to see if side effects subside  Will touch base next week to f/u on symptoms  Advised to continue at current dosage of Viibryd 10 mg daily  Patient reports would like to continue with PHP but reports having difficulty attending regularly due to side effects from medication  Advised patient to communicate with program how she is doing, discussed alternative of resuming outpatient treatment if she has trouble attending PHP  Signatures   Electronically signed by :  BERE Templeton ; Apr 19 2017  1:36PM EST                       (Author)

## 2018-01-17 NOTE — PSYCH
Progress Note  Psychotherapy Provided [facility]: Individual Psychotherapy 50 minutes provided today  Goals addressed in session:   Addressed goals 1-3 of initial plan    D: Met with Key individually  ROS; 'highly stressed and anxious'  States she has poor sleep habits and has been tearful  Session focused upon specific circumstances acerbating symptoms  This includes work schedule, family financial struggles, brother's mental illness and relationship with boyfriend  Denied SI     A: Mylene George presented with appropriate mood and affect however speech was tangential  Mylene George has a lot on her plate right now  She is often looked at as the family member who has a job, therefore can assist with family financial situations  Mylene George is quick to provide excuses as to why this is necessary though she is taken advantage of several times a month  Mylene George continues to stand up for herself and meet her basic needs before the needs of family members who are unemployed  P: Continue individual therapy  Continue process of prioritizing Key's needs so she can move forward in her independence  Pain Scale and Suicide Risk [facility]: Current Pain Assessment: no pain   Current suicide risk is low   Behavioral Health Treatment Plan [facility] includes the [facility]: Diagnosis and Treatment Plan explained to patient, patient relates understanding diagnosis and is agreeable to Treatment Plan  Assessment    1  Severe recurrent major depression (296 33) (F33 2)   2   Anxiety disorder (300 00) (F41 9)    Signatures   Electronically signed by : Michelle Gee LCSW; Aug  8 2017  5:17PM EST                       (Author)    Electronically signed by : Michelle Gee LCSW; Aug  8 2017  5:17PM EST                       (Author)
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
## 2018-01-18 NOTE — PSYCH
Treatment Plan Tracking    #1 Treatment Plan not completed within required time limits due to: Client cancelled/ no-showed scheduled appointment            Signatures   Electronically signed by : Vy Baron LCSW; Oct 26 2017  9:48AM EST                       (Author)

## 2018-01-22 VITALS
HEART RATE: 79 BPM | HEIGHT: 65 IN | WEIGHT: 164.2 LBS | SYSTOLIC BLOOD PRESSURE: 120 MMHG | DIASTOLIC BLOOD PRESSURE: 71 MMHG | BODY MASS INDEX: 27.36 KG/M2

## 2018-01-22 VITALS
HEART RATE: 71 BPM | BODY MASS INDEX: 28.27 KG/M2 | DIASTOLIC BLOOD PRESSURE: 72 MMHG | HEIGHT: 64 IN | SYSTOLIC BLOOD PRESSURE: 119 MMHG | WEIGHT: 165.6 LBS

## 2018-01-22 VITALS — BODY MASS INDEX: 27.5 KG/M2 | WEIGHT: 162.7 LBS

## 2018-01-23 NOTE — PSYCH
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  SHe has maintained employment despite her shoulder injury - this demonstrates progress  P: Continue individual therapy  Continue process of prioritizing Key's needs so she can move forward in her independence  Pain Scale and Suicide Risk [facility]: Current Pain Assessment: no pain, moderate to severe   On a scale of 0 to 10, the patient rates current pain at 5   Current suicide risk is low   Behavioral Health Treatment Plan [facility]: Diagnosis and Treatment Plan explained to patient, patient relates understanding diagnosis and is agreeable to Treatment Plan  Assessment    1  Anxiety disorder (300 00) (F41 9)   2   Severe recurrent major depression (296 33) (F33 2)    Signatures   Electronically signed by : Veronica Watson LCSW; Dec 19 2017 11:50AM EST                       (Author)    Electronically signed by : Veronica Watson LCSW; Dec 19 2017 11:50AM EST                       (Author)

## 2018-01-23 NOTE — MISCELLANEOUS
Message  Patient reports getting night sweats since recent titration of Pristiq to 100 mg daily  She reports her mood has been a bit better but is concerned about the sweating  Will taper Pristiq back down to 75 mg daily and see if night sweats improve  Will f/u at next scheduled visit      Plan  Severe recurrent major depression    · Desvenlafaxine Succinate  MG Oral Tablet Extended Release 24 Hour  (Pristiq)   · Desvenlafaxine Succinate ER 25 MG Oral Tablet Extended Release 24 Hour; Take  1 tablet daily   · Desvenlafaxine Succinate ER 50 MG Oral Tablet Extended Release 24 Hour; Take  1 tablet daily    Signatures   Electronically signed by :  BERE Skelton ; Dec 27 2017  5:03PM EST                       (Author)
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
## 2018-01-23 NOTE — PSYCH
Progress Note  Psychotherapy Provided [facility]: Individual Psychotherapy 50 minutes provided today  Goals addressed in session:   Addressed goals 1-3 of initial plan    D: Met with Key individually  ROS; ' still stressed ' Session focused upon specific circumstances regarding communication with boy friends mother, [place] holiday and seeing her father  Nara Nance also reported experiencing night sweats since increasingly Pristiq  This has been affecting relationship with Tangela Somers and more shame about her body for Key Nance has also been able to save money which has not been the case for some time  She identifies this as progress but still feels trapped in her current living options  Denied SI though depression and anxiety remain  A: Nara Nance presented with flattened affect, congruent mood  She has maintained this job for over 6 months and is happy there though overwhelming at times  She has also been able to save money without giving over to brother or mother - this demonstrates progress  P: Continue individual therapy  Notify Dr Paris Menchaca of above symptoms  Continue process of prioritizing Key's needs so she can move forward in her independence  Pain Scale and Suicide Risk [facility]: Current Pain Assessment: no pain, moderate to severe   On a scale of 0 to 10, the patient rates current pain at 5   Current suicide risk is low   Behavioral Health Treatment Plan [facility]: Diagnosis and Treatment Plan explained to patient, patient relates understanding diagnosis and is agreeable to Treatment Plan  Results/Data  PHQ-9 Adult Depression Screening [id number] 12:04PM Mikaelapriscilla Palacioskennedi     Test Name Result Flag Reference   PHQ-9 Adult Depression Score 14     Over the last two weeks, how often have you been bothered by any of the following problems?   Little interest or pleasure in doing things: More than half the days - 2  Feeling down, depressed, or hopeless: More than half the days - 2  Trouble falling or staying asleep, or sleeping too much: Nearly every day - 3  Feeling tired or having little energy: Nearly every day - 3  Poor appetite or over eating: More than half the days - 2  Feeling bad about yourself - or that you are a failure or have let yourself or your family down: Several days - 1  Trouble concentrating on things, such as reading the newspaper or watching television: Several days - 1  Moving or speaking so slowly that other people could have noticed  Or the opposite -  being so fidgety or restless that you have been moving around a lot more than usual: Not at all - 0  Thoughts that you would be better off dead, or of hurting yourself in some way: Not at all - 0   PHQ-9 Adult Depression Screening Positive     PHQ-9 Difficulty Level Very difficult     PHQ-9 Severity Moderate Depression         Assessment    1  Severe recurrent major depression (296 33) (F33 2)   2  Anxiety disorder (300 00) (F41 9)    Plan   1  Desvenlafaxine Succinate ER 25 MG Oral Tablet Extended Release 24 Hour; Take   1 tablet daily   2  Desvenlafaxine Succinate ER 50 MG Oral Tablet Extended Release 24 Hour; Take   1 tablet daily   3   Desvenlafaxine Succinate  MG Oral Tablet Extended Release 24 Hour   (Pristiq)    Signatures   Electronically signed by : Slade Nieves LCSW; Dec 28 2017  8:57AM EST                       (Author)

## 2018-02-13 DIAGNOSIS — F41.9 ANXIETY DISORDER, UNSPECIFIED TYPE: Primary | ICD-10-CM

## 2018-02-13 RX ORDER — CLONAZEPAM 1 MG/1
1.5 TABLET ORAL
Qty: 45 TABLET | Refills: 1 | Status: SHIPPED | OUTPATIENT
Start: 2018-02-13 | End: 2018-04-20 | Stop reason: SDUPTHER

## 2018-02-15 ENCOUNTER — DOCUMENTATION (OUTPATIENT)
Dept: PSYCHIATRY | Facility: CLINIC | Age: 23
End: 2018-02-15

## 2018-02-28 DIAGNOSIS — F33.2 SEVERE EPISODE OF RECURRENT MAJOR DEPRESSIVE DISORDER, WITHOUT PSYCHOTIC FEATURES (HCC): Primary | ICD-10-CM

## 2018-02-28 RX ORDER — DESVENLAFAXINE 50 MG/1
50 TABLET, EXTENDED RELEASE ORAL DAILY
Qty: 30 TABLET | Refills: 0 | Status: SHIPPED | OUTPATIENT
Start: 2018-02-28 | End: 2018-04-02 | Stop reason: SDUPTHER

## 2018-02-28 RX ORDER — DESVENLAFAXINE 50 MG/1
TABLET, EXTENDED RELEASE ORAL
Qty: 30 TABLET | Refills: 1 | OUTPATIENT
Start: 2018-02-28

## 2018-02-28 RX ORDER — DESVENLAFAXINE 25 MG/1
1 TABLET, EXTENDED RELEASE ORAL DAILY
COMMUNITY
Start: 2017-12-27 | End: 2018-02-28 | Stop reason: SDUPTHER

## 2018-02-28 RX ORDER — DESVENLAFAXINE 50 MG/1
1 TABLET, EXTENDED RELEASE ORAL DAILY
COMMUNITY
Start: 2017-12-27 | End: 2018-02-28 | Stop reason: SDUPTHER

## 2018-02-28 RX ORDER — DESVENLAFAXINE 25 MG/1
1 TABLET, EXTENDED RELEASE ORAL DAILY
Qty: 30 TABLET | Refills: 0 | Status: SHIPPED | OUTPATIENT
Start: 2018-02-28 | End: 2018-04-02 | Stop reason: SDUPTHER

## 2018-02-28 NOTE — PROGRESS NOTES
Will provide 30-day refill of medication  Patient needs a scheduled appointment with provider before further refills can be given

## 2018-03-07 NOTE — PSYCH
Message  Patient No Show Letter - Behavioral Health:     Date: 11/02/2016     Dear Gisele Perez,     We missed seeing you for a scheduled appointment at [facility] on 11/1/2016 at 4:00p with Yancy Campuzano   Our goal is to offer the best possible care to our patients, so we are concerned when you are unable to keep a scheduled appointment  Please call us at [phone] so that we can reschedule the appointment for a date and time that will work for you  We understand that circumstances may arise which make it impossible for you to keep a scheduled appointment  Should this happen in the future, please call us as soon as you know the appointment will be missed  The earlier you let us know, the more likely we can offer your scheduled appointment time to another patient  We hope to hear from you soon       Sincerely,   Dann Rincon      Signatures   Electronically signed by : Dann Rincon, ; Nov 2 2016 10:27AM EST                       (Author)

## 2018-03-07 NOTE — PSYCH
Message  Patient No Show Letter - Behavioral Health:     Date: 09/21/2016     Dear Teddy Learn,     We missed seeing you for a scheduled appointment at [facility] on 09/21/2016     Our goal is to offer the best possible care to our patients, so we are concerned when you are unable to keep a scheduled appointment  Please call us at [phone] so that we can reschedule the appointment for a date and time that will work for you  We understand that circumstances may arise which make it impossible for you to keep a scheduled appointment  Should this happen in the future, please call us as soon as you know the appointment will be missed  The earlier you let us know, the more likely we can offer your scheduled appointment time to another patient  We hope to hear from you soon       Sincerely,        Signatures   Electronically signed by : CRIS ToscanoWLCSW; Sep 21 2016  1:48PM EST                       (Author)

## 2018-03-07 NOTE — PSYCH
Message  Patient No Show Letter - Behavioral Health:     Date: 09/08/2016     Dear Jb Tang,     We missed seeing you for a scheduled appointment at [address] [place] [address] on 9-8-16 AT 11AM with Shawn Read  Our goal is to offer the best possible care to our patients, so we are concerned when you are unable to keep a scheduled appointment  Please call us at [phone] so that we can reschedule the appointment for a date and time that will work for you  We understand that circumstances may arise which make it impossible for you to keep a scheduled appointment  Should this happen in the future, please call us as soon as you know the appointment will be missed  The earlier you let us know, the more likely we can offer your scheduled appointment time to another patient  We hope to hear from you soon       Sincerely,   Shawn Read      Signatures   Electronically signed by : CRIS RingWLCSW; Sep  8 2016  4:36PM EST                       (Author)

## 2018-03-07 NOTE — PSYCH
History of Present Illness    Presenting Problems: Stressors: PATIENT HAS FEW SOCIAL SUPPORT AND FAMILY STRESSORS CURRENTLY UNEMPLOYED WITH WORSENING DEPRESSION AN SUICIDAL IDEATION   Referral Source: DR Ahsan Klein  She is not employed  She is not a smoker  Symptoms: suicidal ideation, plan: NO PLAN, self abusive behaviors, Past: 2 TO 3 YEARS AGO, no homicidal thoughts, no history of violence toward others, depressed mood, anxiety, no psychosis, no medication noncompliance, sleep disturbances, no change in appetite, no agitation and no hypomania  Provisional Diagnosis: Axis I: MDD SERVERE and Axis II: ANXIETY  Substance Abuse: No substance abuse  Psychiatric Treatment History: IN-PT AT AGE [age] PAST IN-PT ADMITS, Current Psychiatrist: DR Ahsan Klein and Therapist: NONE   The patient does not require ambulatory assistance  Legal Issues: The patient does not have legal issues  Action: Record received and Laboratory work received  ACCEPTED  Appointment Date: 03/24/2017        Signatures   Electronically signed by : Katerin North, ; Mar 22 2017  1:59PM EST                       (Author)

## 2018-03-14 ENCOUNTER — OFFICE VISIT (OUTPATIENT)
Dept: BEHAVIORAL/MENTAL HEALTH CLINIC | Facility: CLINIC | Age: 23
End: 2018-03-14
Payer: COMMERCIAL

## 2018-03-14 DIAGNOSIS — F41.1 GENERALIZED ANXIETY DISORDER: Primary | ICD-10-CM

## 2018-03-14 DIAGNOSIS — F33.2 SEVERE EPISODE OF RECURRENT MAJOR DEPRESSIVE DISORDER, WITHOUT PSYCHOTIC FEATURES (HCC): ICD-10-CM

## 2018-03-14 PROCEDURE — 90834 PSYTX W PT 45 MINUTES: CPT | Performed by: SOCIAL WORKER

## 2018-03-14 NOTE — PSYCH
Eulalia Browning  1995     Date of Initial Treatment Plan: 6/16/17  Date of Current Treatment Plan: 03/14/18      Treatment Plan Number 3    Strengths/Personal Resources for Self Care: Giving, good worker, mature, goal orientated  Diagnosis:   1  Generalized anxiety disorder     2  Severe episode of recurrent major depressive disorder, without psychotic features ([facility] [address] )       Area of Needs: Work stress, historic trauma, toxic environment, medical issues, anxiety, depression       Long Term Goal 1:        I am satisfied with my level of independence    Target Date: 7/8/18  Completion Date:          Short Term Objectives for Goal 1:   1  Family dynamics  2  Financial obligations  3  Applying to college for Fall    Long Term Goal 2:        My anxiety and depression have decreased     Target Date: 7/8/18  Completion Date:     Short Term Objectives for Goal 2:   1  Emotional boundaries  2  Allow me to treat myself without guilt  3  Challenge self defeating thoughts         Long Term Goal # 3:       I have addressed my historic and present family dynamics   Target Date: 7/8/18  Completion Date:    Short Term Objectives for Goal 3:        1  Historic trauma  2  Brother returning from intermediate   3  Living environemnt    GOAL 1: Modality: Individual Therapy 2x month  Completion Date:                                    Medication Management  Every 3 months  Completion Date:    GOAL 2: Modality: Individual Therapy 2x month  Completion Date:                                    Medication Management  Every 3 months  Completion Date:      GOAL 3: Modality: Individual Therapy 2x month  Completion Date:                                    Medication Management Every 3 months   Completion Date:          Behavioral Health Treatment Plan [facility]: Diagnosis and Treatment Plan explained to Kayode Bustos relates understanding diagnosis and is agreeable to Treatment Plan         Client Comments : Please share your thoughts, feelings, need and/or experiences regarding your treatment plan:       __________________________________________________________________    __________________________________________________________________    __________________________________________________________________    __________________________________________________________________    _______________________________________                Patient signature, Date Time: __________________________________________             Physician cosigner signature, Date, Time: ________________________________

## 2018-03-14 NOTE — PSYCH
Treatment Plan Tracking    # 3Treatment Plan not completed within required time limits due to: Client cancelled/no-showed scheduled appointment  Jesus Macias

## 2018-03-14 NOTE — PSYCH
Psychotherapy Provided: Individual Psychotherapy 50 minutes     Length of time in session: 50 minutes, follow up in 2 week    Goals addressed in session: Goal 1, Goal 2 and Goal 3      Pain:      none    0    Current suicide risk : Low     D: Met with Key individually  ROS; ' things are pretty bad ' Key complains of increased depression and anxiety  Family is worried about her due to frequent crying spells fleeting SI without plan  Further discussion regarding psychosocial stressors including home environment, self esteem, feelings of abandonment, feelings of guilt/shame  Reassessment of treatment plan completed      A: Harvey Lorenzo presented with constricted affect, depressed, tearful mood  She is goal oriented despite self defeating thoughts  We will have to see if she is able to implement due to her poor view of self  She continues to save money without giving over to brother or mother - this demonstrates progress      P: Continue individual therapy  Continue process of prioritizing Key's needs so she can move forward in her independence  Behavioral Health Treatment Plan [facility]: Diagnosis and Treatment Plan explained to Marquis Hoff relates understanding diagnosis and is agreeable to Treatment Plan   Yes

## 2018-04-02 DIAGNOSIS — F33.2 SEVERE EPISODE OF RECURRENT MAJOR DEPRESSIVE DISORDER, WITHOUT PSYCHOTIC FEATURES (HCC): ICD-10-CM

## 2018-04-03 RX ORDER — DESVENLAFAXINE 50 MG/1
50 TABLET, EXTENDED RELEASE ORAL DAILY
Qty: 30 TABLET | Refills: 0 | Status: SHIPPED | OUTPATIENT
Start: 2018-04-03 | End: 2018-04-20 | Stop reason: SDUPTHER

## 2018-04-03 RX ORDER — DESVENLAFAXINE 25 MG/1
1 TABLET, EXTENDED RELEASE ORAL DAILY
Qty: 30 TABLET | Refills: 0 | Status: SHIPPED | OUTPATIENT
Start: 2018-04-03 | End: 2018-04-20 | Stop reason: SDUPTHER

## 2018-04-20 ENCOUNTER — OFFICE VISIT (OUTPATIENT)
Dept: PSYCHIATRY | Facility: CLINIC | Age: 23
End: 2018-04-20
Payer: COMMERCIAL

## 2018-04-20 VITALS
HEIGHT: 64 IN | HEART RATE: 86 BPM | SYSTOLIC BLOOD PRESSURE: 123 MMHG | DIASTOLIC BLOOD PRESSURE: 72 MMHG | BODY MASS INDEX: 29.6 KG/M2 | WEIGHT: 173.4 LBS

## 2018-04-20 DIAGNOSIS — F43.10 PTSD (POST-TRAUMATIC STRESS DISORDER): Primary | ICD-10-CM

## 2018-04-20 DIAGNOSIS — F33.2 SEVERE EPISODE OF RECURRENT MAJOR DEPRESSIVE DISORDER, WITHOUT PSYCHOTIC FEATURES (HCC): ICD-10-CM

## 2018-04-20 DIAGNOSIS — F41.9 ANXIETY DISORDER, UNSPECIFIED TYPE: ICD-10-CM

## 2018-04-20 PROBLEM — G43.909 MIGRAINE: Status: ACTIVE | Noted: 2017-02-15

## 2018-04-20 PROCEDURE — 99213 OFFICE O/P EST LOW 20 MIN: CPT | Performed by: STUDENT IN AN ORGANIZED HEALTH CARE EDUCATION/TRAINING PROGRAM

## 2018-04-20 RX ORDER — CLONAZEPAM 1 MG/1
TABLET ORAL
Qty: 60 TABLET | Refills: 1 | Status: SHIPPED | OUTPATIENT
Start: 2018-04-20 | End: 2018-06-20 | Stop reason: SDUPTHER

## 2018-04-20 RX ORDER — SPIRONOLACTONE 50 MG/1
1 TABLET, FILM COATED ORAL 2 TIMES DAILY
COMMUNITY
End: 2018-11-13

## 2018-04-20 RX ORDER — TIZANIDINE 4 MG/1
TABLET ORAL
COMMUNITY
Start: 2017-10-30

## 2018-04-20 RX ORDER — DESVENLAFAXINE 50 MG/1
50 TABLET, EXTENDED RELEASE ORAL DAILY
Qty: 30 TABLET | Refills: 1 | Status: SHIPPED | OUTPATIENT
Start: 2018-04-20 | End: 2018-04-20

## 2018-04-20 RX ORDER — IBUPROFEN 800 MG/1
TABLET ORAL
COMMUNITY
Start: 2018-03-10

## 2018-04-20 RX ORDER — OMEPRAZOLE 40 MG/1
CAPSULE, DELAYED RELEASE ORAL
COMMUNITY
Start: 2017-03-28 | End: 2019-03-21

## 2018-04-20 RX ORDER — DESVENLAFAXINE 25 MG/1
1 TABLET, EXTENDED RELEASE ORAL DAILY
Qty: 30 TABLET | Refills: 1 | Status: SHIPPED | OUTPATIENT
Start: 2018-04-20 | End: 2018-04-20 | Stop reason: SDUPTHER

## 2018-04-20 RX ORDER — DESVENLAFAXINE 25 MG/1
3 TABLET, EXTENDED RELEASE ORAL DAILY
Qty: 90 TABLET | Refills: 1 | Status: SHIPPED | OUTPATIENT
Start: 2018-04-20 | End: 2018-05-04 | Stop reason: SDUPTHER

## 2018-04-20 RX ORDER — DESVENLAFAXINE 25 MG/1
3 TABLET, EXTENDED RELEASE ORAL DAILY
Qty: 30 TABLET | Refills: 0 | Status: SHIPPED | OUTPATIENT
Start: 2018-04-20 | End: 2018-04-20 | Stop reason: SDUPTHER

## 2018-04-20 RX ORDER — ETHYNODIOL DIACETATE AND ETHINYL ESTRADIOL 1 MG-50MCG
1 KIT ORAL
COMMUNITY
End: 2019-03-21

## 2018-04-20 NOTE — Clinical Note
KAILASH  Met with Abdoul Flores  Added on Latuda for augmentation treatment for depression, she expresses some concerns about gaining weight, has gained about 10 pounds since last visit a few months ago  Hopefully she'll keep up with the therapy
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
## 2018-04-30 DIAGNOSIS — F33.2 SEVERE EPISODE OF RECURRENT MAJOR DEPRESSIVE DISORDER, WITHOUT PSYCHOTIC FEATURES (HCC): ICD-10-CM

## 2018-05-03 RX ORDER — DESVENLAFAXINE 50 MG/1
50 TABLET, EXTENDED RELEASE ORAL DAILY
Qty: 30 TABLET | Refills: 0 | OUTPATIENT
Start: 2018-05-03

## 2018-05-04 ENCOUNTER — TELEPHONE (OUTPATIENT)
Dept: PSYCHIATRY | Facility: CLINIC | Age: 23
End: 2018-05-04

## 2018-05-04 DIAGNOSIS — F33.2 SEVERE EPISODE OF RECURRENT MAJOR DEPRESSIVE DISORDER, WITHOUT PSYCHOTIC FEATURES (HCC): ICD-10-CM

## 2018-05-04 RX ORDER — DESVENLAFAXINE 25 MG/1
3 TABLET, EXTENDED RELEASE ORAL DAILY
Qty: 90 TABLET | Refills: 1 | Status: SHIPPED | OUTPATIENT
Start: 2018-05-04 | End: 2018-05-07 | Stop reason: SDUPTHER

## 2018-05-04 NOTE — TELEPHONE ENCOUNTER
Patient called requesting refill Desvenlafaxine ER  25 mg and Desvenlafaxine 50 mg  She also mentioned that she had recently gone to the hospital because of  a bad reaction to [place]- an intense panic attack in which she lost the ability to speak  She has not take it since and wanted you to be aware

## 2018-05-04 NOTE — PROGRESS NOTES
Will re-send Pristiq prescription to pharmacy, patient reports pharmacy said it was not available  Discussed with patient anxiety reaction with first dosage of Markus Montana, will try taking it again on a day off from work in the morning to see how she responds  Otherwise, will find an alternative medication to help with mood stabilization

## 2018-05-07 ENCOUNTER — TELEPHONE (OUTPATIENT)
Dept: BEHAVIORAL/MENTAL HEALTH CLINIC | Facility: CLINIC | Age: 23
End: 2018-05-07

## 2018-05-07 DIAGNOSIS — F33.2 SEVERE EPISODE OF RECURRENT MAJOR DEPRESSIVE DISORDER, WITHOUT PSYCHOTIC FEATURES (HCC): ICD-10-CM

## 2018-05-07 RX ORDER — DESVENLAFAXINE 50 MG/1
50 TABLET, EXTENDED RELEASE ORAL DAILY
Qty: 30 TABLET | Refills: 1 | Status: SHIPPED | OUTPATIENT
Start: 2018-05-07 | End: 2018-06-20 | Stop reason: SDUPTHER

## 2018-05-07 RX ORDER — DESVENLAFAXINE 25 MG/1
1 TABLET, EXTENDED RELEASE ORAL DAILY
Qty: 30 TABLET | Refills: 1 | Status: SHIPPED | OUTPATIENT
Start: 2018-05-07 | End: 2018-06-20 | Stop reason: SDUPTHER

## 2018-05-07 NOTE — TELEPHONE ENCOUNTER
Patient left a message saying that her pharmacy told her that she cannot fill Pristiq with her insurance  She is not sure what to do and they said she might have to be on a completely new medication  She says they gave her a two day supply because she was withdrawing but will be completely out as of tomorrow

## 2018-05-07 NOTE — TELEPHONE ENCOUNTER
Spoke to pharmacist and she said she filled a 3 day supply for her over the weekend, so she should be running out tomorrow  I will submit an urgent request to [facility] for coverage  Pharmacist also said she tried running 25mg and 50mg but even that was requiring a prior authorization  Are you able to change the order to 25mg and 50mg instead of 25mg (three tablets daily) because that will get rejected since 25mg and 50mg are another option for a total dose of 75mg?

## 2018-05-08 NOTE — TELEPHONE ENCOUNTER
Request for 25mg and 50mg was approved, notified pharmacy, they said they ordered the medication supply for both doses and should be coming in today, so when it does they will fill it and call the patient  Attempted to call patient, phone went to voicemail and got a message that mailbox was full, unable to leave a message

## 2018-05-16 ENCOUNTER — TELEPHONE (OUTPATIENT)
Dept: BEHAVIORAL/MENTAL HEALTH CLINIC | Facility: CLINIC | Age: 23
End: 2018-05-16

## 2018-05-16 NOTE — TELEPHONE ENCOUNTER
Kit Torres is in a Doctor appointment and will not be able to make it to her 11:00 appointment with you

## 2018-06-11 DIAGNOSIS — F33.2 SEVERE EPISODE OF RECURRENT MAJOR DEPRESSIVE DISORDER, WITHOUT PSYCHOTIC FEATURES (HCC): ICD-10-CM

## 2018-06-17 RX ORDER — LURASIDONE HYDROCHLORIDE 20 MG/1
20 TABLET, FILM COATED ORAL
Qty: 30 TABLET | Refills: 0 | Status: SHIPPED | OUTPATIENT
Start: 2018-06-17 | End: 2018-07-22 | Stop reason: SDUPTHER

## 2018-06-20 ENCOUNTER — OFFICE VISIT (OUTPATIENT)
Dept: PSYCHIATRY | Facility: CLINIC | Age: 23
End: 2018-06-20
Payer: COMMERCIAL

## 2018-06-20 VITALS
WEIGHT: 172.2 LBS | DIASTOLIC BLOOD PRESSURE: 58 MMHG | BODY MASS INDEX: 30.51 KG/M2 | HEART RATE: 80 BPM | SYSTOLIC BLOOD PRESSURE: 104 MMHG | HEIGHT: 63 IN

## 2018-06-20 DIAGNOSIS — F41.9 ANXIETY DISORDER, UNSPECIFIED TYPE: ICD-10-CM

## 2018-06-20 DIAGNOSIS — F40.10 SOCIAL ANXIETY DISORDER: ICD-10-CM

## 2018-06-20 DIAGNOSIS — F43.10 PTSD (POST-TRAUMATIC STRESS DISORDER): Primary | ICD-10-CM

## 2018-06-20 DIAGNOSIS — F33.2 SEVERE EPISODE OF RECURRENT MAJOR DEPRESSIVE DISORDER, WITHOUT PSYCHOTIC FEATURES (HCC): ICD-10-CM

## 2018-06-20 DIAGNOSIS — F33.1 MAJOR DEPRESSIVE DISORDER, RECURRENT, MODERATE (HCC): ICD-10-CM

## 2018-06-20 PROCEDURE — 99213 OFFICE O/P EST LOW 20 MIN: CPT | Performed by: STUDENT IN AN ORGANIZED HEALTH CARE EDUCATION/TRAINING PROGRAM

## 2018-06-20 RX ORDER — DESVENLAFAXINE 50 MG/1
50 TABLET, EXTENDED RELEASE ORAL DAILY
Qty: 30 TABLET | Refills: 1 | Status: SHIPPED | OUTPATIENT
Start: 2018-06-20 | End: 2018-08-22 | Stop reason: SDUPTHER

## 2018-06-20 RX ORDER — DESVENLAFAXINE 25 MG/1
1 TABLET, EXTENDED RELEASE ORAL DAILY
Qty: 30 TABLET | Refills: 1 | Status: SHIPPED | OUTPATIENT
Start: 2018-06-20 | End: 2018-09-05 | Stop reason: SDUPTHER

## 2018-06-20 RX ORDER — CLONAZEPAM 1 MG/1
TABLET ORAL
Qty: 60 TABLET | Refills: 1 | Status: SHIPPED | OUTPATIENT
Start: 2018-06-20 | End: 2018-09-05 | Stop reason: SDUPTHER
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
## 2018-07-04 DIAGNOSIS — F33.2 SEVERE EPISODE OF RECURRENT MAJOR DEPRESSIVE DISORDER, WITHOUT PSYCHOTIC FEATURES (HCC): ICD-10-CM

## 2018-07-05 DIAGNOSIS — F33.2 SEVERE EPISODE OF RECURRENT MAJOR DEPRESSIVE DISORDER, WITHOUT PSYCHOTIC FEATURES (HCC): ICD-10-CM

## 2018-07-05 RX ORDER — DESVENLAFAXINE 50 MG/1
TABLET, EXTENDED RELEASE ORAL
Qty: 30 TABLET | Refills: 1 | OUTPATIENT
Start: 2018-07-05

## 2018-07-06 DIAGNOSIS — F33.2 SEVERE EPISODE OF RECURRENT MAJOR DEPRESSIVE DISORDER, WITHOUT PSYCHOTIC FEATURES (HCC): ICD-10-CM

## 2018-07-06 RX ORDER — DESVENLAFAXINE 50 MG/1
TABLET, EXTENDED RELEASE ORAL
Qty: 30 TABLET | Refills: 1 | OUTPATIENT
Start: 2018-07-06

## 2018-07-08 DIAGNOSIS — F33.2 SEVERE EPISODE OF RECURRENT MAJOR DEPRESSIVE DISORDER, WITHOUT PSYCHOTIC FEATURES (HCC): ICD-10-CM

## 2018-07-09 RX ORDER — DESVENLAFAXINE 50 MG/1
TABLET, EXTENDED RELEASE ORAL
Qty: 30 TABLET | Refills: 1 | OUTPATIENT
Start: 2018-07-09

## 2018-07-16 DIAGNOSIS — F33.2 SEVERE EPISODE OF RECURRENT MAJOR DEPRESSIVE DISORDER, WITHOUT PSYCHOTIC FEATURES (HCC): ICD-10-CM

## 2018-07-16 RX ORDER — DESVENLAFAXINE 50 MG/1
TABLET, EXTENDED RELEASE ORAL
Qty: 30 TABLET | Refills: 1 | OUTPATIENT
Start: 2018-07-16

## 2018-07-20 NOTE — TELEPHONE ENCOUNTER
Prior authorizations are in place with Jayesh Jackson for desvenlafaxine ER 50mg and 25mg - approved 05/07/2018 - 05/08/2019  I spoke with the pharmacist  Kit Perezleonel was given a 5 day supply of desvenlafaxine 50mg on 05/19  When the pharmacist tried to process again, it is too soon  The pharmacist put in for automatic refill on 07/23  Previously Kit Torres was dispensed 50mg, #30 on 06/08/18  [facility] refilled 25mg, #30 in the beginning of July and previously on 06/04  I gave the pharmacist my direct number to call to coordinate medication refills

## 2018-07-20 NOTE — TELEPHONE ENCOUNTER
Pt called and stated she needs refills on pristiq 25mg and 50mg  Pt states she is out of both/pharmacy let her refill 25mg but she was taking 3 at a time to get 75 mg  Pt states ins co is not authorizing refills

## 2018-07-22 DIAGNOSIS — F33.2 SEVERE EPISODE OF RECURRENT MAJOR DEPRESSIVE DISORDER, WITHOUT PSYCHOTIC FEATURES (HCC): ICD-10-CM

## 2018-07-23 RX ORDER — LURASIDONE HYDROCHLORIDE 20 MG/1
20 TABLET, FILM COATED ORAL
Qty: 30 TABLET | Refills: 0 | Status: SHIPPED | OUTPATIENT
Start: 2018-07-23 | End: 2019-03-21

## 2018-07-23 NOTE — TELEPHONE ENCOUNTER
Funmilayo Mustafa left me a message today about medication  Has run out all together, last dose yesterday  [facility] pharmacist also left a message that desvenlafaxine ER 50mg could not be processed  I spoke with representative at [facility]  Pharmacy needed to use a different NDC code to process ([id number])  Pharmacist was then able to process 50mg tabs, but needed to order and would have it for tomor  Not able to fill 25mg tabs until 07/29  As per pharmacy, 50mg tabs last filled 06/08 (as we now know due to incorrect   [address] code) and Funmilayo Mustafa has been using 25mg tabs to make up the full dose (75mg)  Her last RF of 25mg was at the beginning of July  Funmilayo Mustafa had also left me a message about medication  Has run out all together, last dose yesterday  Again I spoke with representative from [place] [id number]  The pharmacy would need to reprocess July's refill of the 25mg tabs, reflecting a change to the dosing in order to fill now  I LM for [facility] re the above  She can refill 50mg tomor and on 07/29, she can fill the 25mg and resume the full dose of 75mg then  I gave her my number to clarify directions

## 2018-07-30 ENCOUNTER — DOCUMENTATION (OUTPATIENT)
Dept: BEHAVIORAL/MENTAL HEALTH CLINIC | Facility: CLINIC | Age: 23
End: 2018-07-30

## 2018-07-30 DIAGNOSIS — F43.10 PTSD (POST-TRAUMATIC STRESS DISORDER): Primary | ICD-10-CM

## 2018-07-30 DIAGNOSIS — F40.10 SOCIAL ANXIETY DISORDER: ICD-10-CM

## 2018-07-30 DIAGNOSIS — F33.1 MAJOR DEPRESSIVE DISORDER, RECURRENT, MODERATE (HCC): ICD-10-CM
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
## 2018-08-06 ENCOUNTER — DOCUMENTATION (OUTPATIENT)
Dept: PSYCHIATRY | Facility: CLINIC | Age: 23
End: 2018-08-06

## 2018-08-06 DIAGNOSIS — F33.1 MAJOR DEPRESSIVE DISORDER, RECURRENT, MODERATE (HCC): ICD-10-CM

## 2018-08-06 DIAGNOSIS — F43.10 PTSD (POST-TRAUMATIC STRESS DISORDER): Primary | ICD-10-CM

## 2018-08-06 DIAGNOSIS — F40.10 SOCIAL ANXIETY DISORDER: ICD-10-CM

## 2018-08-06 NOTE — PROGRESS NOTES
TREATMENT PLAN (Medication Management Only)        [facility]    Name and Date of Birth:  Luther Alvarenga 23 y o  1995  Date of Treatment Plan: August 6, 2018   Date of Last Visit: 6/20/2018    Diagnosis/Diagnoses:    1  PTSD (post-traumatic stress disorder)    2  Major depressive disorder, recurrent, moderate (HCC)    3  Social anxiety disorder      Strengths/Personal Resources for Self-Care: taking medications as prescribed, ability to communicate needs, average or above intelligence, being resoureceful  Area/Areas of need (in own words): anxiety symptoms, depressive symptoms, mood swings, family conflict, financial problems  1  Long Term Goal: alleviate depression, improve self-esteem, able to live independently, college degree   Target Date: 1 year - 8/6/2019  Person/Persons responsible for completion of goal: BERE Mueller   2   Short Term Objective (s) - How will we reach this goal?:   A  Provider new recommended medication/dosage changes and/or continue medication(s): continue current medications as prescribed  B   Would strongly encourage to re-start individual psychotherapy     C   Continue to work on distress tolerance skills     Target Date: 3 months - 11/6/2018  Person/Persons Responsible for Completion of Goal: BERE Mueller  Progress Towards Goals: continuing treatment  Treatment Modality: medication management every 3 months  Review due 90 to 120 days from date of this plan: 3 months - 11/6/2018  Expected length of service: maintenance  My Physician/PA/NP and I have developed this plan together and I agree to work on the goals and objectives  I understand the treatment goals that were developed for my treatment    Signature:       Date and time:  Signature of parent/guardian if under age of 15 years: Date and time:  Signature of provider:      Date and time:  Signature of Supervising Physician:    Date and time: 8/6/2018      Jani Handy MD

## 2018-08-08 ENCOUNTER — DOCUMENTATION (OUTPATIENT)
Dept: PSYCHIATRY | Facility: CLINIC | Age: 23
End: 2018-08-08

## 2018-08-08 NOTE — PROGRESS NOTES
Treatment Plan not completed within required time limits due to: no show appointment on 8/6/2018      Also NS 6/7/2018

## 2018-08-22 DIAGNOSIS — F33.2 SEVERE EPISODE OF RECURRENT MAJOR DEPRESSIVE DISORDER, WITHOUT PSYCHOTIC FEATURES (HCC): ICD-10-CM

## 2018-08-22 RX ORDER — DESVENLAFAXINE 50 MG/1
50 TABLET, EXTENDED RELEASE ORAL DAILY
Qty: 30 TABLET | Refills: 0 | Status: SHIPPED | OUTPATIENT
Start: 2018-08-22 | End: 2018-09-05 | Stop reason: SDUPTHER

## 2018-08-28 DIAGNOSIS — F33.2 SEVERE EPISODE OF RECURRENT MAJOR DEPRESSIVE DISORDER, WITHOUT PSYCHOTIC FEATURES (HCC): ICD-10-CM

## 2018-08-28 RX ORDER — DESVENLAFAXINE 25 MG/1
TABLET, EXTENDED RELEASE ORAL
Qty: 30 TABLET | Refills: 1 | OUTPATIENT
Start: 2018-08-28

## 2018-09-04 DIAGNOSIS — F33.2 SEVERE EPISODE OF RECURRENT MAJOR DEPRESSIVE DISORDER, WITHOUT PSYCHOTIC FEATURES (HCC): ICD-10-CM

## 2018-09-04 DIAGNOSIS — F41.9 ANXIETY DISORDER, UNSPECIFIED TYPE: ICD-10-CM

## 2018-09-04 RX ORDER — CLONAZEPAM 1 MG/1
TABLET ORAL
Qty: 60 TABLET | Refills: 0 | Status: CANCELLED | OUTPATIENT
Start: 2018-09-04

## 2018-09-04 RX ORDER — DESVENLAFAXINE 50 MG/1
50 TABLET, EXTENDED RELEASE ORAL DAILY
Qty: 30 TABLET | Refills: 0 | Status: CANCELLED | OUTPATIENT
Start: 2018-09-04

## 2018-09-05 DIAGNOSIS — F43.10 PTSD (POST-TRAUMATIC STRESS DISORDER): ICD-10-CM

## 2018-09-05 DIAGNOSIS — F41.9 ANXIETY DISORDER, UNSPECIFIED TYPE: ICD-10-CM

## 2018-09-05 DIAGNOSIS — F33.1 MAJOR DEPRESSIVE DISORDER, RECURRENT, MODERATE (HCC): Primary | ICD-10-CM

## 2018-09-05 DIAGNOSIS — F33.2 SEVERE EPISODE OF RECURRENT MAJOR DEPRESSIVE DISORDER, WITHOUT PSYCHOTIC FEATURES (HCC): ICD-10-CM

## 2018-09-05 RX ORDER — DESVENLAFAXINE 25 MG/1
1 TABLET, EXTENDED RELEASE ORAL DAILY
Qty: 90 TABLET | Refills: 0 | Status: SHIPPED | OUTPATIENT
Start: 2018-09-05 | End: 2018-11-13 | Stop reason: SDUPTHER

## 2018-09-05 RX ORDER — CLONAZEPAM 1 MG/1
TABLET ORAL
Qty: 60 TABLET | Refills: 2 | Status: SHIPPED | OUTPATIENT
Start: 2018-09-05 | End: 2018-10-17 | Stop reason: SDUPTHER

## 2018-09-05 RX ORDER — DESVENLAFAXINE 50 MG/1
50 TABLET, EXTENDED RELEASE ORAL DAILY
Qty: 90 TABLET | Refills: 0 | Status: SHIPPED | OUTPATIENT
Start: 2018-09-05 | End: 2018-11-13 | Stop reason: SDUPTHER

## 2018-10-17 DIAGNOSIS — F41.9 ANXIETY DISORDER, UNSPECIFIED TYPE: ICD-10-CM

## 2018-10-17 RX ORDER — CLONAZEPAM 1 MG/1
TABLET ORAL
Qty: 60 TABLET | Refills: 1 | Status: SHIPPED | OUTPATIENT
Start: 2018-10-17 | End: 2018-11-13 | Stop reason: SDUPTHER

## 2018-10-17 NOTE — TELEPHONE ENCOUNTER
Pt has been experiencing some chest discomfort, believes its a withdrawal symptom because she has been without her klonopin 1mg  For two days  She called to request a refill to be sent to [facility] pharmacy  I told her if she continues to feel any symptoms maybe she should go to the ER

## 2018-11-08 ENCOUNTER — DOCUMENTATION (OUTPATIENT)
Dept: PSYCHIATRY | Facility: CLINIC | Age: 23
End: 2018-11-08

## 2018-11-08 NOTE — PROGRESS NOTES
Treatment Plan done but not signed at time of office visit due to:  Leaving before signing Tx Plan at office visit 8/6/2018 , will review and sign at next OV

## 2018-11-13 ENCOUNTER — OFFICE VISIT (OUTPATIENT)
Dept: PSYCHIATRY | Facility: CLINIC | Age: 23
End: 2018-11-13
Payer: COMMERCIAL

## 2018-11-13 VITALS — WEIGHT: 192 LBS | BODY MASS INDEX: 33.6 KG/M2

## 2018-11-13 DIAGNOSIS — F33.1 MAJOR DEPRESSIVE DISORDER, RECURRENT, MODERATE (HCC): ICD-10-CM

## 2018-11-13 DIAGNOSIS — F33.2 SEVERE EPISODE OF RECURRENT MAJOR DEPRESSIVE DISORDER, WITHOUT PSYCHOTIC FEATURES (HCC): ICD-10-CM

## 2018-11-13 DIAGNOSIS — F40.10 SOCIAL ANXIETY DISORDER: ICD-10-CM

## 2018-11-13 DIAGNOSIS — F41.9 ANXIETY DISORDER, UNSPECIFIED TYPE: ICD-10-CM

## 2018-11-13 DIAGNOSIS — F43.10 PTSD (POST-TRAUMATIC STRESS DISORDER): Primary | ICD-10-CM

## 2018-11-13 PROCEDURE — 99213 OFFICE O/P EST LOW 20 MIN: CPT | Performed by: STUDENT IN AN ORGANIZED HEALTH CARE EDUCATION/TRAINING PROGRAM

## 2018-11-13 RX ORDER — DESVENLAFAXINE 50 MG/1
50 TABLET, EXTENDED RELEASE ORAL DAILY
Qty: 90 TABLET | Refills: 0 | Status: SHIPPED | OUTPATIENT
Start: 2018-11-13 | End: 2019-03-06 | Stop reason: SDUPTHER

## 2018-11-13 RX ORDER — ETHYNODIOL DIACETATE AND ETHINYL ESTRADIOL 1 MG-50MCG
1 KIT ORAL
COMMUNITY
Start: 2018-05-01

## 2018-11-13 RX ORDER — IBUPROFEN 800 MG/1
1 TABLET ORAL EVERY 8 HOURS PRN
COMMUNITY
Start: 2018-07-04 | End: 2019-03-21

## 2018-11-13 RX ORDER — FLUTICASONE PROPIONATE 50 MCG
SPRAY, SUSPENSION (ML) NASAL
Refills: 5 | COMMUNITY
Start: 2018-09-28

## 2018-11-13 RX ORDER — DESVENLAFAXINE 25 MG/1
1 TABLET, EXTENDED RELEASE ORAL DAILY
Qty: 90 TABLET | Refills: 0 | Status: SHIPPED | OUTPATIENT
Start: 2018-11-13 | End: 2019-03-06 | Stop reason: SDUPTHER

## 2018-11-13 RX ORDER — OMEPRAZOLE 40 MG/1
40 CAPSULE, DELAYED RELEASE ORAL
COMMUNITY
Start: 2018-07-05 | End: 2018-11-13

## 2018-11-13 RX ORDER — TRAMADOL HYDROCHLORIDE 50 MG/1
TABLET ORAL
Refills: 0 | COMMUNITY
Start: 2018-11-01

## 2018-11-13 RX ORDER — CLONAZEPAM 1 MG/1
TABLET ORAL
Qty: 60 TABLET | Refills: 1 | Status: SHIPPED | OUTPATIENT
Start: 2018-11-13 | End: 2019-03-21 | Stop reason: SDUPTHER
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
## 2019-01-20 DIAGNOSIS — F33.2 SEVERE EPISODE OF RECURRENT MAJOR DEPRESSIVE DISORDER, WITHOUT PSYCHOTIC FEATURES (HCC): ICD-10-CM

## 2019-01-21 RX ORDER — DESVENLAFAXINE 50 MG/1
TABLET, EXTENDED RELEASE ORAL
Qty: 90 TABLET | Refills: 0 | OUTPATIENT
Start: 2019-01-21

## 2019-01-24 NOTE — TELEPHONE ENCOUNTER
Nara Nance called the main number and I spoke with her  When she picked up her desvenlafaxine, she paid $40 for only 5 pills  When she checked with [facility] and her insurance, she was told [facility] has stopped carrying the generic desvenlafaxine based on the [facility]  Nara Nance was told the NDC's that insurance will cover are:  [id number]  [id number]  [id number]    We talked about other pharmacies that might carry the medication asper NDC's and I will check with them tomorrow  Preferred pharmacies:  [facility] ([phone]) or [facility] ([phone]) both on [address]  Nara Nance has my number if she needs assistance before other pharmacies are called

## 2019-01-25 NOTE — TELEPHONE ENCOUNTER
I spoke with the pharmacists at [facility] and [facility]  Neither carry the medication with any of the NDC's given  I spoke with Vivek Vo and reviewed same  There is a prior auth in place for desvenlafaxine - but the approval had no information about approval for specific NDC  I told Vivek Vo she needs to contact her insurance company to help her locate an acceptable pharmacy  She is worried she will need to change medications  Will call back as needed

## 2019-01-25 NOTE — TELEPHONE ENCOUNTER
Leonel Chapman left a message:  She spoke with her insurance, explained again her situation and the insurance will cover medication

## 2019-02-23 RX ORDER — DESVENLAFAXINE 50 MG/1
TABLET, EXTENDED RELEASE ORAL
Qty: 30 TABLET | Refills: 0 | OUTPATIENT
Start: 2019-02-23

## 2019-02-26 RX ORDER — DESVENLAFAXINE 50 MG/1
TABLET, EXTENDED RELEASE ORAL
Qty: 30 TABLET | Refills: 0 | OUTPATIENT
Start: 2019-02-26

## 2019-03-06 DIAGNOSIS — F33.2 SEVERE EPISODE OF RECURRENT MAJOR DEPRESSIVE DISORDER, WITHOUT PSYCHOTIC FEATURES (HCC): ICD-10-CM

## 2019-03-06 RX ORDER — DESVENLAFAXINE 50 MG/1
TABLET, EXTENDED RELEASE ORAL
Qty: 30 TABLET | Refills: 6 | OUTPATIENT
Start: 2019-03-06

## 2019-03-06 RX ORDER — DESVENLAFAXINE 50 MG/1
50 TABLET, EXTENDED RELEASE ORAL DAILY
Qty: 90 TABLET | Refills: 0 | Status: CANCELLED | OUTPATIENT
Start: 2019-03-06

## 2019-03-06 RX ORDER — DESVENLAFAXINE 25 MG/1
1 TABLET, EXTENDED RELEASE ORAL DAILY
Qty: 90 TABLET | Refills: 0 | Status: CANCELLED | OUTPATIENT
Start: 2019-03-06

## 2019-03-06 RX ORDER — DESVENLAFAXINE 25 MG/1
1 TABLET, EXTENDED RELEASE ORAL DAILY
Qty: 90 TABLET | Refills: 0 | Status: SHIPPED | OUTPATIENT
Start: 2019-03-06 | End: 2019-05-21 | Stop reason: SDUPTHER

## 2019-03-06 RX ORDER — DESVENLAFAXINE 50 MG/1
50 TABLET, EXTENDED RELEASE ORAL DAILY
Qty: 90 TABLET | Refills: 0 | Status: SHIPPED | OUTPATIENT
Start: 2019-03-06 | End: 2019-05-21 | Stop reason: SDUPTHER

## 2019-03-06 NOTE — PSYCH
Date of Initial Treatment Plan: 6/16/17  Date of Current Treatment Plan: 10/26/17  Treatment Plan 2  Strengths/Personal Resources for Self Care: Giving, good worker, mature, goal orientated  Diagnosis:   Axis I: Anxiety disorder; Mood Disorser; MDD recurrent; severe     Area of Needs: Work stress, historic trauma, toxic environment, medical issues, anxiety, depression  Long Term Goals:   I am satisfied with my level of independence   Target Date: 2/22/18      My anxiety and depression have decreased   Target Date: 2/22/18      I have addressed my historic and present family dynamics   Target Date: 2/22/18    Short Term Objectives:   Goal 1:   1  Family dynamics  2  Financial obligations  3  Alternate job interviews    Target Date: 2/22/18      Goal 2:   1  Emotional boundaries  2  Take time for myself  3  Driving  Target Date: 2/22/18      Goal 3:   1  Historic trauma  Target Date: 2/22/18      GOAL 1: Modality: Individual 4 x per month Target Date: 2/22/18       The person(s) responsible for carrying out the plan is Silvio Santana  GOAL 2: Modality: Individual 4 x per month Target Date: 2/22/18       The person(s) responsible for carrying out the plan is Silvio Santana  GOAL 3: Modality: Individual 4 x per month Target Date: 2/22/18         The person(s) responsible for carrying out the plan is Silvio Santana  The first scheduled review date is 2/22/18  The expected length of service is 120 Days  Level of functioning at initial assessment: 70  The highest level of functioning in the past year was 70  The current level of functioning is 70               CLIENT COMMENTS / Please share your thoughts, feelings, need and/or experiences regarding your treatment plan: _____________________________________________________________________________________________________________________________________________________________________________________________________________________________________________________________________________________________________________________ Date/Time: ______________     Patient Signature: _________________________________ Date/Time: ______________       Electronically signed by : Magalie Sargent, MOLINAW; Oct 26 2017 10:55AM EST                       (Author) Home

## 2019-03-15 ENCOUNTER — DOCUMENTATION (OUTPATIENT)
Dept: PSYCHIATRY | Facility: CLINIC | Age: 24
End: 2019-03-15

## 2019-03-21 ENCOUNTER — OFFICE VISIT (OUTPATIENT)
Dept: PSYCHIATRY | Facility: CLINIC | Age: 24
End: 2019-03-21
Payer: COMMERCIAL

## 2019-03-21 ENCOUNTER — TELEPHONE (OUTPATIENT)
Dept: PSYCHIATRY | Facility: CLINIC | Age: 24
End: 2019-03-21

## 2019-03-21 VITALS
HEART RATE: 78 BPM | WEIGHT: 201.4 LBS | SYSTOLIC BLOOD PRESSURE: 129 MMHG | BODY MASS INDEX: 35.24 KG/M2 | DIASTOLIC BLOOD PRESSURE: 78 MMHG

## 2019-03-21 DIAGNOSIS — F40.10 SOCIAL ANXIETY DISORDER: ICD-10-CM

## 2019-03-21 DIAGNOSIS — F33.1 MAJOR DEPRESSIVE DISORDER, RECURRENT, MODERATE (HCC): Primary | ICD-10-CM

## 2019-03-21 DIAGNOSIS — F43.10 PTSD (POST-TRAUMATIC STRESS DISORDER): ICD-10-CM

## 2019-03-21 DIAGNOSIS — F41.9 ANXIETY DISORDER, UNSPECIFIED TYPE: ICD-10-CM

## 2019-03-21 DIAGNOSIS — F60.3 BORDERLINE PERSONALITY DISORDER (HCC): ICD-10-CM

## 2019-03-21 PROBLEM — N30.10 INTERSTITIAL CYSTITIS: Status: ACTIVE | Noted: 2018-11-30

## 2019-03-21 PROCEDURE — 90833 PSYTX W PT W E/M 30 MIN: CPT | Performed by: STUDENT IN AN ORGANIZED HEALTH CARE EDUCATION/TRAINING PROGRAM

## 2019-03-21 PROCEDURE — 99213 OFFICE O/P EST LOW 20 MIN: CPT | Performed by: STUDENT IN AN ORGANIZED HEALTH CARE EDUCATION/TRAINING PROGRAM

## 2019-03-21 RX ORDER — DESVENLAFAXINE 50 MG/1
TABLET, EXTENDED RELEASE ORAL DAILY
COMMUNITY
End: 2019-03-21

## 2019-03-21 RX ORDER — BACLOFEN 10 MG/1
TABLET ORAL
COMMUNITY
Start: 2019-02-25

## 2019-03-21 RX ORDER — CLONAZEPAM 1 MG/1
TABLET ORAL
Qty: 60 TABLET | Refills: 1 | Status: SHIPPED | OUTPATIENT
Start: 2019-03-21 | End: 2019-06-16 | Stop reason: SDUPTHER

## 2019-03-21 RX ORDER — TOPIRAMATE 25 MG/1
25 TABLET ORAL 2 TIMES DAILY
Qty: 60 TABLET | Refills: 1 | Status: SHIPPED | OUTPATIENT
Start: 2019-03-21 | End: 2019-05-21 | Stop reason: SINTOL
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
## 2019-03-21 NOTE — PSYCH
TREATMENT PLAN (Medication Management Only)        [facility]    Name and Date of Birth:  Mar Both 26 y o  1995  Date of Treatment Plan: March 21, 2019  Diagnosis/Diagnoses:    1  Major depressive disorder, recurrent, moderate (HCC)    2  Anxiety disorder, unspecified type      Strengths/Personal Resources for Self-Care: "Hard worker, social, nice"  Area/Areas of need (in own words): "Depression and anxiety, living a healthy life"  1  Long Term Goal: Get her own place, live independently and happily, get a college degree  Target Date: 1 year - 3/21/2020  Person/Persons responsible for completion of goal: BERE Novoa   2   Short Term Objective (s) - How will we reach this goal?:   A  Provider new recommended medication/dosage changes and/or continue medication(s): continue current medications as prescribed  Start Topamax  B  Re-start individual therapy  Target Date: 3 months - 6/21/2019  Person/Persons Responsible for Completion of Goal: BERE Novoa  Progress Towards Goals: starting treatment  Treatment Modality: medication management every 3 months  Review due 90 to 120 days from date of this plan: 3 months - 6/21/2019  Expected length of service: maintenance  My Physician/PA/NP and I have developed this plan together and I agree to work on the goals and objectives  I understand the treatment goals that were developed for my treatment    Signature:       Date and time:  Signature of parent/guardian if under age of 15 years: Date and time:  Signature of provider:      Date and time:  Signature of Supervising Physician:    Date and time: 3/21/2019      Mine Levine MD

## 2019-03-26 ENCOUNTER — TELEPHONE (OUTPATIENT)
Dept: BEHAVIORAL/MENTAL HEALTH CLINIC | Facility: CLINIC | Age: 24
End: 2019-03-26

## 2019-05-01 ENCOUNTER — DOCUMENTATION (OUTPATIENT)
Dept: PSYCHIATRY | Facility: CLINIC | Age: 24
End: 2019-05-01

## 2019-05-21 ENCOUNTER — OFFICE VISIT (OUTPATIENT)
Dept: PSYCHIATRY | Facility: CLINIC | Age: 24
End: 2019-05-21
Payer: COMMERCIAL

## 2019-05-21 DIAGNOSIS — F33.1 MAJOR DEPRESSIVE DISORDER, RECURRENT, MODERATE (HCC): ICD-10-CM

## 2019-05-21 DIAGNOSIS — F43.10 PTSD (POST-TRAUMATIC STRESS DISORDER): Primary | ICD-10-CM

## 2019-05-21 DIAGNOSIS — F60.3 BORDERLINE PERSONALITY DISORDER (HCC): ICD-10-CM

## 2019-05-21 DIAGNOSIS — F40.10 SOCIAL ANXIETY DISORDER: ICD-10-CM

## 2019-05-21 DIAGNOSIS — F33.2 SEVERE EPISODE OF RECURRENT MAJOR DEPRESSIVE DISORDER, WITHOUT PSYCHOTIC FEATURES (HCC): ICD-10-CM

## 2019-05-21 PROCEDURE — 99213 OFFICE O/P EST LOW 20 MIN: CPT | Performed by: PHYSICIAN ASSISTANT

## 2019-05-21 RX ORDER — BACLOFEN 10 MG/1
TABLET ORAL
COMMUNITY
End: 2019-05-21 | Stop reason: SDUPTHER

## 2019-05-21 RX ORDER — ETHYNODIOL DIACETATE AND ETHINYL ESTRADIOL 1 MG-50MCG
KIT ORAL
COMMUNITY
End: 2019-05-21 | Stop reason: SDUPTHER

## 2019-05-21 RX ORDER — DESVENLAFAXINE 25 MG/1
1 TABLET, EXTENDED RELEASE ORAL DAILY
Qty: 30 TABLET | Refills: 0 | Status: SHIPPED | OUTPATIENT
Start: 2019-05-21 | End: 2019-07-22 | Stop reason: SDUPTHER

## 2019-05-21 RX ORDER — DESVENLAFAXINE 50 MG/1
50 TABLET, EXTENDED RELEASE ORAL DAILY
Qty: 30 TABLET | Refills: 0 | Status: SHIPPED | OUTPATIENT
Start: 2019-05-21 | End: 2019-06-01 | Stop reason: SDUPTHER

## 2019-05-24 RX ORDER — DESVENLAFAXINE 50 MG/1
TABLET, EXTENDED RELEASE ORAL
Qty: 30 TABLET | Refills: 6 | OUTPATIENT
Start: 2019-05-24

## 2019-06-01 DIAGNOSIS — F33.2 SEVERE EPISODE OF RECURRENT MAJOR DEPRESSIVE DISORDER, WITHOUT PSYCHOTIC FEATURES (HCC): ICD-10-CM

## 2019-06-01 RX ORDER — DESVENLAFAXINE 50 MG/1
TABLET, EXTENDED RELEASE ORAL
Qty: 90 TABLET | Refills: 0 | Status: SHIPPED | OUTPATIENT
Start: 2019-06-01 | End: 2019-07-22 | Stop reason: SDUPTHER

## 2019-06-05 ENCOUNTER — TELEPHONE (OUTPATIENT)
Dept: PSYCHIATRY | Facility: CLINIC | Age: 24
End: 2019-06-05

## 2019-06-06 ENCOUNTER — TELEPHONE (OUTPATIENT)
Dept: BEHAVIORAL/MENTAL HEALTH CLINIC | Facility: CLINIC | Age: 24
End: 2019-06-06

## 2019-06-06 ENCOUNTER — DOCUMENTATION (OUTPATIENT)
Dept: PSYCHIATRY | Facility: CLINIC | Age: 24
End: 2019-06-06

## 2019-06-06 NOTE — TELEPHONE ENCOUNTER
Both desvenlafaxine 50mg [id number] need prior auth's  Pauldaniella Shrestha hasn't had it for awhile and she is starting to get sick

## 2019-06-07 ENCOUNTER — TELEPHONE (OUTPATIENT)
Dept: PSYCHIATRY | Facility: CLINIC | Age: 24
End: 2019-06-07

## 2019-06-16 DIAGNOSIS — F41.9 ANXIETY DISORDER, UNSPECIFIED TYPE: ICD-10-CM

## 2019-06-17 RX ORDER — CLONAZEPAM 1 MG/1
TABLET ORAL
Qty: 60 TABLET | Refills: 0 | Status: SHIPPED | OUTPATIENT
Start: 2019-06-17 | End: 2019-07-22 | Stop reason: SDUPTHER

## 2019-07-01 ENCOUNTER — DOCUMENTATION (OUTPATIENT)
Dept: PSYCHIATRY | Facility: CLINIC | Age: 24
End: 2019-07-01

## 2019-07-01 NOTE — PROGRESS NOTES
Treatment Plan not completed within required time limits due to: no show appointment appointment  on 6/28/2019

## 2019-07-02 RX ORDER — DESVENLAFAXINE 50 MG/1
TABLET, EXTENDED RELEASE ORAL
Qty: 30 TABLET | Refills: 7 | OUTPATIENT
Start: 2019-07-02

## 2019-07-20 DIAGNOSIS — F41.9 ANXIETY DISORDER, UNSPECIFIED TYPE: ICD-10-CM

## 2019-07-20 RX ORDER — CLONAZEPAM 1 MG/1
TABLET ORAL
Qty: 60 TABLET | Refills: 0 | OUTPATIENT
Start: 2019-07-20

## 2019-07-22 ENCOUNTER — TELEPHONE (OUTPATIENT)
Dept: BEHAVIORAL/MENTAL HEALTH CLINIC | Facility: CLINIC | Age: 24
End: 2019-07-22

## 2019-07-22 DIAGNOSIS — F41.9 ANXIETY DISORDER, UNSPECIFIED TYPE: ICD-10-CM

## 2019-07-22 DIAGNOSIS — F33.2 SEVERE EPISODE OF RECURRENT MAJOR DEPRESSIVE DISORDER, WITHOUT PSYCHOTIC FEATURES (HCC): ICD-10-CM

## 2019-07-22 RX ORDER — CLONAZEPAM 1 MG/1
TABLET ORAL
Qty: 60 TABLET | Refills: 0 | OUTPATIENT
Start: 2019-07-22

## 2019-07-22 RX ORDER — DESVENLAFAXINE 50 MG/1
50 TABLET, EXTENDED RELEASE ORAL DAILY
Qty: 30 TABLET | Refills: 0 | Status: SHIPPED | OUTPATIENT
Start: 2019-07-22 | End: 2019-07-24 | Stop reason: SDUPTHER

## 2019-07-22 RX ORDER — DESVENLAFAXINE 25 MG/1
TABLET, EXTENDED RELEASE ORAL
Qty: 30 TABLET | Refills: 2 | OUTPATIENT
Start: 2019-07-22

## 2019-07-22 RX ORDER — CLONAZEPAM 1 MG/1
TABLET ORAL
Qty: 60 TABLET | Refills: 0 | Status: SHIPPED | OUTPATIENT
Start: 2019-07-22 | End: 2019-07-24 | Stop reason: SDUPTHER

## 2019-07-22 RX ORDER — DESVENLAFAXINE 25 MG/1
1 TABLET, EXTENDED RELEASE ORAL DAILY
Qty: 30 TABLET | Refills: 0 | Status: SHIPPED | OUTPATIENT
Start: 2019-07-22 | End: 2019-07-24 | Stop reason: SDUPTHER

## 2019-07-22 NOTE — TELEPHONE ENCOUNTER
Need's medication today, she has been out since yesterday  She don't want to go through withdraw  She said she will come in July 24th    Need's  desvenlafaxine 50mg  desvenlafaxine er 25mg  Clonazepam 1mg

## 2019-07-22 NOTE — PROGRESS NOTES
A refill was provided for the patient's Pristiq 75 mg and Klonopin 1 mg to cover until upcoming scheduled appointment on 7/24/2019 with Dr Ondina Michael  PDMP checked and patient has been refilling prescriptions appropriately

## 2019-07-24 ENCOUNTER — APPOINTMENT (OUTPATIENT)
Dept: LAB | Facility: MEDICAL CENTER | Age: 24
End: 2019-07-24
Payer: COMMERCIAL

## 2019-07-24 ENCOUNTER — OFFICE VISIT (OUTPATIENT)
Dept: PSYCHIATRY | Facility: CLINIC | Age: 24
End: 2019-07-24
Payer: COMMERCIAL

## 2019-07-24 ENCOUNTER — TRANSCRIBE ORDERS (OUTPATIENT)
Dept: ADMINISTRATIVE | Facility: HOSPITAL | Age: 24
End: 2019-07-24

## 2019-07-24 DIAGNOSIS — F41.9 ANXIETY DISORDER, UNSPECIFIED TYPE: ICD-10-CM

## 2019-07-24 DIAGNOSIS — M79.10 MYALGIA: Primary | ICD-10-CM

## 2019-07-24 DIAGNOSIS — F60.3 BORDERLINE PERSONALITY DISORDER (HCC): ICD-10-CM

## 2019-07-24 DIAGNOSIS — F40.10 SOCIAL ANXIETY DISORDER: ICD-10-CM

## 2019-07-24 DIAGNOSIS — F43.10 PTSD (POST-TRAUMATIC STRESS DISORDER): Primary | ICD-10-CM

## 2019-07-24 DIAGNOSIS — F33.2 SEVERE EPISODE OF RECURRENT MAJOR DEPRESSIVE DISORDER, WITHOUT PSYCHOTIC FEATURES (HCC): ICD-10-CM

## 2019-07-24 DIAGNOSIS — F33.1 MAJOR DEPRESSIVE DISORDER, RECURRENT, MODERATE (HCC): ICD-10-CM

## 2019-07-24 DIAGNOSIS — M79.10 MYALGIA: ICD-10-CM

## 2019-07-24 LAB
BASOPHILS # BLD AUTO: 0.04 THOUSANDS/ΜL (ref 0–0.1)
BASOPHILS NFR BLD AUTO: 0 % (ref 0–1)
CRP SERPL QL: 11.8 MG/L
EOSINOPHIL # BLD AUTO: 0.05 THOUSAND/ΜL (ref 0–0.61)
EOSINOPHIL NFR BLD AUTO: 0 % (ref 0–6)
ERYTHROCYTE [DISTWIDTH] IN BLOOD BY AUTOMATED COUNT: 12.7 % (ref 11.6–15.1)
ERYTHROCYTE [SEDIMENTATION RATE] IN BLOOD: 27 MM/HOUR (ref 0–20)
HCT VFR BLD AUTO: 40.5 % (ref 34.8–46.1)
HGB BLD-MCNC: 13.1 G/DL (ref 11.5–15.4)
IMM GRANULOCYTES # BLD AUTO: 0.05 THOUSAND/UL (ref 0–0.2)
IMM GRANULOCYTES NFR BLD AUTO: 0 % (ref 0–2)
LYMPHOCYTES # BLD AUTO: 3.63 THOUSANDS/ΜL (ref 0.6–4.47)
LYMPHOCYTES NFR BLD AUTO: 29 % (ref 14–44)
MCH RBC QN AUTO: 30.6 PG (ref 26.8–34.3)
MCHC RBC AUTO-ENTMCNC: 32.3 G/DL (ref 31.4–37.4)
MCV RBC AUTO: 95 FL (ref 82–98)
MONOCYTES # BLD AUTO: 0.6 THOUSAND/ΜL (ref 0.17–1.22)
MONOCYTES NFR BLD AUTO: 5 % (ref 4–12)
NEUTROPHILS # BLD AUTO: 8.26 THOUSANDS/ΜL (ref 1.85–7.62)
NEUTS SEG NFR BLD AUTO: 66 % (ref 43–75)
NRBC BLD AUTO-RTO: 0 /100 WBCS
PLATELET # BLD AUTO: 317 THOUSANDS/UL (ref 149–390)
PMV BLD AUTO: 9.8 FL (ref 8.9–12.7)
RBC # BLD AUTO: 4.28 MILLION/UL (ref 3.81–5.12)
WBC # BLD AUTO: 12.63 THOUSAND/UL (ref 4.31–10.16)

## 2019-07-24 PROCEDURE — 86140 C-REACTIVE PROTEIN: CPT

## 2019-07-24 PROCEDURE — 99213 OFFICE O/P EST LOW 20 MIN: CPT | Performed by: STUDENT IN AN ORGANIZED HEALTH CARE EDUCATION/TRAINING PROGRAM

## 2019-07-24 PROCEDURE — 36415 COLL VENOUS BLD VENIPUNCTURE: CPT

## 2019-07-24 PROCEDURE — 85652 RBC SED RATE AUTOMATED: CPT

## 2019-07-24 PROCEDURE — 85025 COMPLETE CBC W/AUTO DIFF WBC: CPT

## 2019-07-24 RX ORDER — DESVENLAFAXINE 25 MG/1
1 TABLET, EXTENDED RELEASE ORAL DAILY
Qty: 90 TABLET | Refills: 0 | Status: SHIPPED | OUTPATIENT
Start: 2019-07-24 | End: 2019-11-17 | Stop reason: SDUPTHER

## 2019-07-24 RX ORDER — DESVENLAFAXINE 50 MG/1
50 TABLET, EXTENDED RELEASE ORAL DAILY
Qty: 90 TABLET | Refills: 0 | Status: SHIPPED | OUTPATIENT
Start: 2019-07-24 | End: 2019-12-27 | Stop reason: SDUPTHER

## 2019-07-24 RX ORDER — CLONAZEPAM 1 MG/1
TABLET ORAL
Qty: 60 TABLET | Refills: 1 | Status: SHIPPED | OUTPATIENT
Start: 2019-07-24 | End: 2019-10-18 | Stop reason: SDUPTHER

## 2019-07-24 NOTE — BH TREATMENT PLAN
TREATMENT PLAN (Medication Management Only)        [facility]    Name and Date of Birth:  Enmanuel Zamora 26 y o  1995  Date of Treatment Plan: July 24, 2019  Diagnosis/Diagnoses:    1  PTSD (post-traumatic stress disorder)    2  Major depressive disorder, recurrent, moderate (HCC)    3  Social anxiety disorder    4  Borderline personality disorder ([facility] [address] )    5  Severe episode of recurrent major depressive disorder, [facility] psychotic [facility]    6  Anxiety disorder, unspecified type      Strengths/Personal Resources for Self-Care: "Kind, smart, funny"  Area/Areas of need (in own words): "Procratination, self-loathing, low confidence, anxiety"  1  Long Term Goal: To move out of current living situation, to live a healthy life away from toxicity      Target Date: 1 year - 7/24/2020  Person/Persons responsible for completion of goal: BERE Chaidez   2   Short Term Objective (s) - How will we reach this goal?:   A  Provider new recommended medication/dosage changes and/or continue medication(s): Continue current medications     Kaykay Stoner to work on finding a therapist   C   Work on coping skills     Target Date: 3 months - 10/24/2019  Person/Persons Responsible for Completion of Goal: BERE Chaidez  Progress Towards Goals: continuing treatment  Treatment Modality: medication management every 3 months  Review due 90 to 120 days from date of this plan: 3 months - 10/24/2019  Expected length of service: maintenance  My Physician/PA/NP and I have developed this plan together and I agree to work on the goals and objectives  I understand the treatment goals that were developed for my treatment
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
## 2019-07-24 NOTE — Clinical Note
Please schedule a [address] consultation visit for Arun Huang with me on 8/21 from 12-1 PM if Arun Huang can make it in at that time  Thanks

## 2019-08-09 ENCOUNTER — APPOINTMENT (OUTPATIENT)
Dept: LAB | Facility: MEDICAL CENTER | Age: 24
End: 2019-08-09
Payer: OTHER MISCELLANEOUS

## 2019-08-09 ENCOUNTER — TRANSCRIBE ORDERS (OUTPATIENT)
Dept: ADMINISTRATIVE | Facility: HOSPITAL | Age: 24
End: 2019-08-09

## 2019-08-09 DIAGNOSIS — M54.5 LOW BACK PAIN, UNSPECIFIED BACK PAIN LATERALITY, UNSPECIFIED CHRONICITY, WITH SCIATICA PRESENCE UNSPECIFIED: ICD-10-CM

## 2019-08-09 DIAGNOSIS — M54.5 LOW BACK PAIN, UNSPECIFIED BACK PAIN LATERALITY, UNSPECIFIED CHRONICITY, WITH SCIATICA PRESENCE UNSPECIFIED: Primary | ICD-10-CM

## 2019-08-09 LAB
BASOPHILS # BLD AUTO: 0.02 THOUSANDS/ΜL (ref 0–0.1)
BASOPHILS NFR BLD AUTO: 0 % (ref 0–1)
CRP SERPL QL: 5.6 MG/L
EOSINOPHIL # BLD AUTO: 0.1 THOUSAND/ΜL (ref 0–0.61)
EOSINOPHIL NFR BLD AUTO: 1 % (ref 0–6)
ERYTHROCYTE [DISTWIDTH] IN BLOOD BY AUTOMATED COUNT: 12.5 % (ref 11.6–15.1)
ERYTHROCYTE [SEDIMENTATION RATE] IN BLOOD: 23 MM/HOUR (ref 0–20)
HCT VFR BLD AUTO: 38.8 % (ref 34.8–46.1)
HGB BLD-MCNC: 12.5 G/DL (ref 11.5–15.4)
IMM GRANULOCYTES # BLD AUTO: 0.02 THOUSAND/UL (ref 0–0.2)
IMM GRANULOCYTES NFR BLD AUTO: 0 % (ref 0–2)
LYMPHOCYTES # BLD AUTO: 3.5 THOUSANDS/ΜL (ref 0.6–4.47)
LYMPHOCYTES NFR BLD AUTO: 41 % (ref 14–44)
MCH RBC QN AUTO: 30.9 PG (ref 26.8–34.3)
MCHC RBC AUTO-ENTMCNC: 32.2 G/DL (ref 31.4–37.4)
MCV RBC AUTO: 96 FL (ref 82–98)
MONOCYTES # BLD AUTO: 0.35 THOUSAND/ΜL (ref 0.17–1.22)
MONOCYTES NFR BLD AUTO: 4 % (ref 4–12)
NEUTROPHILS # BLD AUTO: 4.56 THOUSANDS/ΜL (ref 1.85–7.62)
NEUTS SEG NFR BLD AUTO: 54 % (ref 43–75)
NRBC BLD AUTO-RTO: 0 /100 WBCS
PLATELET # BLD AUTO: 282 THOUSANDS/UL (ref 149–390)
PMV BLD AUTO: 10.4 FL (ref 8.9–12.7)
RBC # BLD AUTO: 4.05 MILLION/UL (ref 3.81–5.12)
WBC # BLD AUTO: 8.55 THOUSAND/UL (ref 4.31–10.16)

## 2019-08-09 PROCEDURE — 86140 C-REACTIVE PROTEIN: CPT

## 2019-08-09 PROCEDURE — 85652 RBC SED RATE AUTOMATED: CPT

## 2019-08-09 PROCEDURE — 85025 COMPLETE CBC W/AUTO DIFF WBC: CPT

## 2019-08-09 PROCEDURE — 36415 COLL VENOUS BLD VENIPUNCTURE: CPT

## 2019-09-06 NOTE — PROGRESS NOTES
Dr Rona Luna,    I'm just now seeing this message  I must have missed it  I saw that this patient hasn't been seen since July  Do you still want me to call to see if she's interested? If so is there a specific time you have in mind?

## 2019-09-09 ENCOUNTER — TELEPHONE (OUTPATIENT)
Dept: PSYCHIATRY | Facility: CLINIC | Age: 24
End: 2019-09-09

## 2019-09-24 ENCOUNTER — TELEPHONE (OUTPATIENT)
Dept: PSYCHIATRY | Facility: CLINIC | Age: 24
End: 2019-09-24

## 2019-09-24 NOTE — TELEPHONE ENCOUNTER
Arnold Epstein, Dr Danny Richardson patient left a message stating that she is interested in a [address] [facility] consultation

## 2019-09-24 NOTE — TELEPHONE ENCOUNTER
Priscilla Query,     Let me know your thoughts, and I can send intake packet      Thanks,  Vickey Grace

## 2019-10-01 NOTE — TELEPHONE ENCOUNTER
Jani Blue,     I did discuss [address] [place] with her  The main concern that may hinder TMS is she does have borderline personality features but definitely has had significant depressive symptoms with multiple failed med trials and poor tolerance of medications  [place] a try if insurance is willing to cover it  She also is not the most compliant with appointments so that would have to be stressed to her if she was to do it

## 2019-10-18 DIAGNOSIS — F41.9 ANXIETY DISORDER, UNSPECIFIED TYPE: ICD-10-CM

## 2019-10-18 NOTE — TELEPHONE ENCOUNTER
Called patient and scheduled appointment for January 6th with Dr Anayeli Cortés    Patient requesting refill as per prior message

## 2019-10-18 NOTE — TELEPHONE ENCOUNTER
Auto refill request received for Key's Clonazepam 1 mg tabs  Belem Hilario,  Could you please call Sam Frausto and let her know that she will need to make an apptmnt with Dr Fide Portillo to F/U before we can send her Clonazepam order to covering provider    Thank you,  Magalis Sawant

## 2019-10-19 RX ORDER — CLONAZEPAM 1 MG/1
TABLET ORAL
Qty: 60 TABLET | Refills: 0 | Status: SHIPPED | OUTPATIENT
Start: 2019-10-19 | End: 2019-11-18 | Stop reason: SDUPTHER

## 2019-10-21 NOTE — TELEPHONE ENCOUNTER
[address]  was called by Nursing and informed that Dr Nora Jefferson renewed Klonopin 1 mg tab script  For Dr Lynsey Murray information

## 2019-11-17 DIAGNOSIS — F33.2 SEVERE EPISODE OF RECURRENT MAJOR DEPRESSIVE DISORDER, WITHOUT PSYCHOTIC FEATURES (HCC): ICD-10-CM

## 2019-11-18 DIAGNOSIS — F41.9 ANXIETY DISORDER, UNSPECIFIED TYPE: ICD-10-CM

## 2019-11-18 RX ORDER — CLONAZEPAM 1 MG/1
TABLET ORAL
Qty: 60 TABLET | Refills: 0 | OUTPATIENT
Start: 2019-11-18

## 2019-11-18 RX ORDER — DESVENLAFAXINE 25 MG/1
TABLET, EXTENDED RELEASE ORAL
Qty: 30 TABLET | Refills: 2 | Status: SHIPPED | OUTPATIENT
Start: 2019-11-18 | End: 2020-01-06 | Stop reason: SDUPTHER

## 2019-11-18 RX ORDER — CLONAZEPAM 1 MG/1
TABLET ORAL
Qty: 60 TABLET | Refills: 1 | Status: SHIPPED | OUTPATIENT
Start: 2019-11-18 | End: 2020-01-06 | Stop reason: SDUPTHER
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
## 2019-12-12 DIAGNOSIS — F33.2 SEVERE EPISODE OF RECURRENT MAJOR DEPRESSIVE DISORDER, WITHOUT PSYCHOTIC FEATURES (HCC): ICD-10-CM

## 2019-12-12 RX ORDER — DESVENLAFAXINE 50 MG/1
TABLET, EXTENDED RELEASE ORAL
Qty: 30 TABLET | Refills: 0 | OUTPATIENT
Start: 2019-12-12

## 2019-12-27 DIAGNOSIS — F33.2 SEVERE EPISODE OF RECURRENT MAJOR DEPRESSIVE DISORDER, WITHOUT PSYCHOTIC FEATURES (HCC): ICD-10-CM

## 2019-12-29 RX ORDER — DESVENLAFAXINE 50 MG/1
TABLET, EXTENDED RELEASE ORAL
Qty: 30 TABLET | Refills: 0 | Status: SHIPPED | OUTPATIENT
Start: 2019-12-29 | End: 2020-01-06 | Stop reason: SDUPTHER

## 2020-01-06 ENCOUNTER — OFFICE VISIT (OUTPATIENT)
Dept: PSYCHIATRY | Facility: CLINIC | Age: 25
End: 2020-01-06
Payer: COMMERCIAL

## 2020-01-06 VITALS
HEART RATE: 96 BPM | HEIGHT: 65 IN | SYSTOLIC BLOOD PRESSURE: 120 MMHG | DIASTOLIC BLOOD PRESSURE: 75 MMHG | WEIGHT: 198.4 LBS | BODY MASS INDEX: 33.05 KG/M2

## 2020-01-06 DIAGNOSIS — F43.10 PTSD (POST-TRAUMATIC STRESS DISORDER): Primary | ICD-10-CM

## 2020-01-06 DIAGNOSIS — F33.2 SEVERE EPISODE OF RECURRENT MAJOR DEPRESSIVE DISORDER, WITHOUT PSYCHOTIC FEATURES (HCC): ICD-10-CM

## 2020-01-06 DIAGNOSIS — F40.10 SOCIAL ANXIETY DISORDER: ICD-10-CM

## 2020-01-06 DIAGNOSIS — F41.9 ANXIETY DISORDER, UNSPECIFIED TYPE: ICD-10-CM

## 2020-01-06 DIAGNOSIS — F33.1 MAJOR DEPRESSIVE DISORDER, RECURRENT, MODERATE (HCC): ICD-10-CM

## 2020-01-06 DIAGNOSIS — F60.3 BORDERLINE PERSONALITY DISORDER (HCC): ICD-10-CM

## 2020-01-06 PROCEDURE — 99213 OFFICE O/P EST LOW 20 MIN: CPT | Performed by: STUDENT IN AN ORGANIZED HEALTH CARE EDUCATION/TRAINING PROGRAM

## 2020-01-06 RX ORDER — DESVENLAFAXINE 25 MG/1
1 TABLET, EXTENDED RELEASE ORAL DAILY
Qty: 30 TABLET | Refills: 2 | Status: SHIPPED | OUTPATIENT
Start: 2020-01-06 | End: 2020-03-02 | Stop reason: SDUPTHER

## 2020-01-06 RX ORDER — CLONAZEPAM 2 MG/1
TABLET ORAL
Qty: 45 TABLET | Refills: 2 | Status: SHIPPED | OUTPATIENT
Start: 2020-01-06 | End: 2020-03-02 | Stop reason: SDUPTHER

## 2020-01-06 RX ORDER — DESVENLAFAXINE 50 MG/1
50 TABLET, EXTENDED RELEASE ORAL DAILY
Qty: 30 TABLET | Refills: 0 | Status: SHIPPED | OUTPATIENT
Start: 2020-01-06 | End: 2020-03-02 | Stop reason: SDUPTHER
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
## 2020-01-06 NOTE — BH TREATMENT PLAN
TREATMENT PLAN (Medication Management Only)        [facility]    Name and Date of Birth:  Simeon Pedroza 27 y o  1995  Date of Treatment Plan: January 6, 2020  Diagnosis/Diagnoses:    1  PTSD (post-traumatic stress disorder)    2  Major depressive disorder, recurrent, moderate (HCC)    3  Social anxiety disorder    4  Borderline personality disorder [facility]      Strengths/Personal Resources for Self-Care: "Kindness, social, good friend"  Area/Areas of need (in own words): "Self-esteem, self-image, following through with things"  1  Long Term Goal: "To live independently, graduate college with a degree, be happy"  Target Date: 1 year - 1/6/2021  Person/Persons responsible for completion of goal: BERE Mason   2   Short Term Objective (s) - How will we reach this goal?:   A  Provider new recommended medication/dosage changes and/or continue medication(s): continue current medications as prescribed  B   "Stay motivated, following through, saving money"  C   "Getting a job, exercising, being more consistent with therapy "  Target Date: 3 months - 4/6/2020  Person/Persons Responsible for Completion of Goal: BERE Mason  Progress Towards Goals: continuing treatment  Treatment Modality: medication management every 3 weeks  Review due 6 months from date of this plan: 6 months - 7/6/2020  Expected length of service: maintenance unless revised  My Physician/PA/NP and I have developed this plan together and I agree to work on the goals and objectives  I understand the treatment goals that were developed for my treatment

## 2020-01-29 DIAGNOSIS — F33.2 SEVERE EPISODE OF RECURRENT MAJOR DEPRESSIVE DISORDER, WITHOUT PSYCHOTIC FEATURES (HCC): ICD-10-CM

## 2020-01-29 RX ORDER — DESVENLAFAXINE 50 MG/1
TABLET, EXTENDED RELEASE ORAL
Qty: 30 TABLET | Refills: 0 | OUTPATIENT
Start: 2020-01-29

## 2020-03-02 DIAGNOSIS — F41.9 ANXIETY DISORDER, UNSPECIFIED TYPE: ICD-10-CM

## 2020-03-02 DIAGNOSIS — F33.2 SEVERE EPISODE OF RECURRENT MAJOR DEPRESSIVE DISORDER, WITHOUT PSYCHOTIC FEATURES (HCC): ICD-10-CM

## 2020-03-02 RX ORDER — DESVENLAFAXINE 50 MG/1
50 TABLET, EXTENDED RELEASE ORAL DAILY
Qty: 30 TABLET | Refills: 2 | Status: SHIPPED | OUTPATIENT
Start: 2020-03-02 | End: 2020-05-15 | Stop reason: SDUPTHER

## 2020-03-02 RX ORDER — CLONAZEPAM 2 MG/1
TABLET ORAL
Qty: 45 TABLET | Refills: 2 | Status: SHIPPED | OUTPATIENT
Start: 2020-03-02 | End: 2020-05-15 | Stop reason: SDUPTHER

## 2020-03-02 RX ORDER — DESVENLAFAXINE 50 MG/1
TABLET, EXTENDED RELEASE ORAL
Qty: 30 TABLET | Refills: 0 | OUTPATIENT
Start: 2020-03-02

## 2020-03-02 RX ORDER — DESVENLAFAXINE 25 MG/1
1 TABLET, EXTENDED RELEASE ORAL DAILY
Qty: 30 TABLET | Refills: 2 | Status: SHIPPED | OUTPATIENT
Start: 2020-03-02 | End: 2020-05-15 | Stop reason: SDUPTHER

## 2020-03-02 NOTE — TELEPHONE ENCOUNTER
Patient requesting refill be sent to the pharmacy   Per pharmacist pre auth required for [facility] AUDRAIN 2 mg tablet

## 2020-03-05 ENCOUNTER — TELEPHONE (OUTPATIENT)
Dept: PSYCHIATRY | Facility: CLINIC | Age: 25
End: 2020-03-05

## 2020-03-05 NOTE — TELEPHONE ENCOUNTER
Nursing sent a prior auth request for Clonazepam 2 mg to [facility] via fax   Will await the outcome of this prior authorization request

## 2020-03-06 NOTE — TELEPHONE ENCOUNTER
Received a faxed notification from [facility] that clonazepam is on for,uary and no P  A needed  The prescription needs to be processed with a preferred [facility] code and the pharmacy should contact them  I spoke with the pharmacist and reviewed the above  LM for Key: P A not needed; medication is formulary processing code needs to be different and pharmacy needs to contact insurance; gave nursing number to jacy if problem persists

## 2020-04-17 ENCOUNTER — TELEPHONE (OUTPATIENT)
Dept: PSYCHIATRY | Facility: CLINIC | Age: 25
End: 2020-04-17

## 2020-05-15 ENCOUNTER — TELEMEDICINE (OUTPATIENT)
Dept: PSYCHIATRY | Facility: CLINIC | Age: 25
End: 2020-05-15
Payer: COMMERCIAL

## 2020-05-15 DIAGNOSIS — F33.2 SEVERE EPISODE OF RECURRENT MAJOR DEPRESSIVE DISORDER, WITHOUT PSYCHOTIC FEATURES (HCC): ICD-10-CM

## 2020-05-15 DIAGNOSIS — F33.1 MAJOR DEPRESSIVE DISORDER, RECURRENT, MODERATE (HCC): ICD-10-CM

## 2020-05-15 DIAGNOSIS — F60.3 BORDERLINE PERSONALITY DISORDER (HCC): ICD-10-CM

## 2020-05-15 DIAGNOSIS — F41.9 ANXIETY DISORDER, UNSPECIFIED TYPE: ICD-10-CM

## 2020-05-15 DIAGNOSIS — F43.10 PTSD (POST-TRAUMATIC STRESS DISORDER): Primary | ICD-10-CM

## 2020-05-15 DIAGNOSIS — F40.10 SOCIAL ANXIETY DISORDER: ICD-10-CM

## 2020-05-15 PROCEDURE — 90833 PSYTX W PT W E/M 30 MIN: CPT | Performed by: STUDENT IN AN ORGANIZED HEALTH CARE EDUCATION/TRAINING PROGRAM

## 2020-05-15 PROCEDURE — 99213 OFFICE O/P EST LOW 20 MIN: CPT | Performed by: STUDENT IN AN ORGANIZED HEALTH CARE EDUCATION/TRAINING PROGRAM

## 2020-05-15 RX ORDER — PRAZOSIN HYDROCHLORIDE 2 MG/1
2 CAPSULE ORAL
Qty: 90 CAPSULE | Refills: 0 | Status: SHIPPED | OUTPATIENT
Start: 2020-05-15 | End: 2020-08-14

## 2020-05-15 RX ORDER — CLONAZEPAM 2 MG/1
TABLET ORAL
Qty: 45 TABLET | Refills: 2 | Status: SHIPPED | OUTPATIENT
Start: 2020-05-15 | End: 2020-11-18 | Stop reason: SDUPTHER

## 2020-05-15 RX ORDER — DESVENLAFAXINE 50 MG/1
50 TABLET, EXTENDED RELEASE ORAL DAILY
Qty: 30 TABLET | Refills: 2 | Status: SHIPPED | OUTPATIENT
Start: 2020-05-15 | End: 2020-08-14

## 2020-05-15 RX ORDER — DESVENLAFAXINE 25 MG/1
1 TABLET, EXTENDED RELEASE ORAL DAILY
Qty: 30 TABLET | Refills: 2 | Status: SHIPPED | OUTPATIENT
Start: 2020-05-15 | End: 2020-11-18

## 2020-05-25 ENCOUNTER — TELEPHONE (OUTPATIENT)
Dept: PSYCHIATRY | Facility: CLINIC | Age: 25
End: 2020-05-25

## 2020-05-25 ENCOUNTER — TELEPHONE (OUTPATIENT)
Dept: OTHER | Facility: OTHER | Age: 25
End: 2020-05-25

## 2020-08-14 DIAGNOSIS — F33.2 SEVERE EPISODE OF RECURRENT MAJOR DEPRESSIVE DISORDER, WITHOUT PSYCHOTIC FEATURES (HCC): ICD-10-CM

## 2020-08-14 DIAGNOSIS — F43.10 PTSD (POST-TRAUMATIC STRESS DISORDER): ICD-10-CM

## 2020-08-14 RX ORDER — DESVENLAFAXINE 50 MG/1
TABLET, EXTENDED RELEASE ORAL
Qty: 30 TABLET | Refills: 2 | Status: SHIPPED | OUTPATIENT
Start: 2020-08-14 | End: 2020-11-18 | Stop reason: SDUPTHER

## 2020-08-14 RX ORDER — PRAZOSIN HYDROCHLORIDE 2 MG/1
2 CAPSULE ORAL
Qty: 30 CAPSULE | Refills: 2 | Status: SHIPPED | OUTPATIENT
Start: 2020-08-14 | End: 2020-12-11 | Stop reason: SDUPTHER

## 2020-11-18 DIAGNOSIS — F33.2 SEVERE EPISODE OF RECURRENT MAJOR DEPRESSIVE DISORDER, WITHOUT PSYCHOTIC FEATURES (HCC): ICD-10-CM

## 2020-11-18 DIAGNOSIS — F41.9 ANXIETY DISORDER, UNSPECIFIED TYPE: ICD-10-CM

## 2020-11-18 RX ORDER — DESVENLAFAXINE 25 MG/1
TABLET, EXTENDED RELEASE ORAL
Qty: 30 TABLET | Refills: 0 | Status: SHIPPED | OUTPATIENT
Start: 2020-11-18 | End: 2020-11-18 | Stop reason: SDUPTHER

## 2020-11-18 RX ORDER — CLONAZEPAM 2 MG/1
TABLET ORAL
Qty: 45 TABLET | Refills: 2 | Status: SHIPPED | OUTPATIENT
Start: 2020-11-18 | End: 2021-03-02 | Stop reason: SDUPTHER

## 2020-11-18 RX ORDER — DESVENLAFAXINE 25 MG/1
1 TABLET, EXTENDED RELEASE ORAL DAILY
Qty: 90 TABLET | Refills: 0 | Status: SHIPPED | OUTPATIENT
Start: 2020-11-18 | End: 2021-01-15 | Stop reason: SDUPTHER

## 2020-11-18 RX ORDER — DESVENLAFAXINE 50 MG/1
50 TABLET, EXTENDED RELEASE ORAL DAILY
Qty: 90 TABLET | Refills: 0 | Status: SHIPPED | OUTPATIENT
Start: 2020-11-18 | End: 2021-01-15 | Stop reason: SDUPTHER

## 2020-12-10 ENCOUNTER — TELEPHONE (OUTPATIENT)
Dept: PSYCHIATRY | Facility: CLINIC | Age: 25
End: 2020-12-10

## 2020-12-11 DIAGNOSIS — F43.10 PTSD (POST-TRAUMATIC STRESS DISORDER): ICD-10-CM

## 2020-12-11 RX ORDER — PRAZOSIN HYDROCHLORIDE 2 MG/1
2 CAPSULE ORAL
Qty: 90 CAPSULE | Refills: 0 | Status: SHIPPED | OUTPATIENT
Start: 2020-12-11

## 2021-01-15 ENCOUNTER — TELEPHONE (OUTPATIENT)
Dept: OTHER | Facility: OTHER | Age: 26
End: 2021-01-15

## 2021-01-15 DIAGNOSIS — F33.2 SEVERE EPISODE OF RECURRENT MAJOR DEPRESSIVE DISORDER, WITHOUT PSYCHOTIC FEATURES (HCC): ICD-10-CM

## 2021-01-15 RX ORDER — DESVENLAFAXINE 50 MG/1
50 TABLET, EXTENDED RELEASE ORAL DAILY
Qty: 7 TABLET | Refills: 0 | Status: SHIPPED | OUTPATIENT
Start: 2021-01-15 | End: 2021-03-02 | Stop reason: SDUPTHER

## 2021-01-15 RX ORDER — DESVENLAFAXINE 25 MG/1
1 TABLET, EXTENDED RELEASE ORAL DAILY
Qty: 7 TABLET | Refills: 0 | Status: SHIPPED | OUTPATIENT
Start: 2021-01-15 | End: 2021-03-02 | Stop reason: SDUPTHER

## 2021-01-16 NOTE — TELEPHONE ENCOUNTER
[phone] RICHAR-MOM PT JORDEN Worthy [facility] [birth year] DR FALCON PT IS AWAY IN [place] , MOM MAILED MEDICATION BUT PT WON'T RECIEVE IT FOR A WEEK   MOM WOULD LIKE TO SPEAK ABOUT SENDING MORE MEDICATION

## 2021-01-16 NOTE — PROGRESS NOTES
Jose Sherwood tells me she is in vacation in [place]  Ran out of pristiq  Mom is sending it but will not arrive in time  She ran out and is having w/d symptoms (unpleasant but not serious)  Ran out 2 days ago  Asked I send a 7 day supply of both pristiq scripts

## 2021-01-27 ENCOUNTER — DOCUMENTATION (OUTPATIENT)
Dept: PSYCHIATRY | Facility: CLINIC | Age: 26
End: 2021-01-27

## 2021-02-09 DIAGNOSIS — F33.2 SEVERE EPISODE OF RECURRENT MAJOR DEPRESSIVE DISORDER, WITHOUT PSYCHOTIC FEATURES (HCC): ICD-10-CM

## 2021-02-09 RX ORDER — DESVENLAFAXINE 25 MG/1
1 TABLET, EXTENDED RELEASE ORAL DAILY
Qty: 7 TABLET | Refills: 0 | OUTPATIENT
Start: 2021-02-09

## 2021-02-09 RX ORDER — DESVENLAFAXINE 50 MG/1
50 TABLET, EXTENDED RELEASE ORAL DAILY
Qty: 7 TABLET | Refills: 0 | OUTPATIENT
Start: 2021-02-09

## 2021-02-26 ENCOUNTER — DOCUMENTATION (OUTPATIENT)
Dept: PSYCHIATRY | Facility: CLINIC | Age: 26
End: 2021-02-26
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
## 2021-03-02 DIAGNOSIS — F41.9 ANXIETY DISORDER, UNSPECIFIED TYPE: ICD-10-CM

## 2021-03-02 DIAGNOSIS — F33.2 SEVERE EPISODE OF RECURRENT MAJOR DEPRESSIVE DISORDER, WITHOUT PSYCHOTIC FEATURES (HCC): ICD-10-CM

## 2021-03-02 RX ORDER — CLONAZEPAM 2 MG/1
TABLET ORAL
Qty: 45 TABLET | Refills: 2 | Status: SHIPPED | OUTPATIENT
Start: 2021-03-02

## 2021-03-02 RX ORDER — DESVENLAFAXINE 25 MG/1
1 TABLET, EXTENDED RELEASE ORAL DAILY
Qty: 90 TABLET | Refills: 0 | Status: SHIPPED | OUTPATIENT
Start: 2021-03-02

## 2021-03-02 RX ORDER — DESVENLAFAXINE 50 MG/1
50 TABLET, EXTENDED RELEASE ORAL DAILY
Qty: 90 TABLET | Refills: 0 | Status: SHIPPED | OUTPATIENT
Start: 2021-03-02

## 2021-03-02 NOTE — TELEPHONE ENCOUNTER
Patient's appt with Dr Charlee Michaud will be cancelled due to discharge  Will discuss with staff on transferring care to an adult provider  D/C Letter (signed/certified) from Mark Anthony Swan MD for Doylene Boast placed in outgoing mail, 3/2/2021

## 2021-03-02 NOTE — TELEPHONE ENCOUNTER
L/M for patient informing of Dr Rae Horta previous note  Informed her to call intake if she would like to continue with our practice and to schedule with adult provider

## 2021-03-03 ENCOUNTER — TELEPHONE (OUTPATIENT)
Dept: PSYCHIATRY | Facility: CLINIC | Age: 26
End: 2021-03-03

## 2021-03-03 NOTE — TELEPHONE ENCOUNTER
Left message for patient to return call  She needs to be transferred to an adult provider  Can schedule in Dr Parker Bryant next new patient slot and add 4 week follow up from there

## 2021-06-10 DIAGNOSIS — F33.2 SEVERE EPISODE OF RECURRENT MAJOR DEPRESSIVE DISORDER, WITHOUT PSYCHOTIC FEATURES (HCC): ICD-10-CM

## 2021-06-10 RX ORDER — DESVENLAFAXINE 25 MG/1
TABLET, EXTENDED RELEASE ORAL
Qty: 30 TABLET | Refills: 2 | OUTPATIENT
Start: 2021-06-10

## 2021-06-10 RX ORDER — DESVENLAFAXINE 50 MG/1
TABLET, EXTENDED RELEASE ORAL
Qty: 30 TABLET | Refills: 2 | OUTPATIENT
Start: 2021-06-10